# Patient Record
Sex: FEMALE | Race: WHITE | NOT HISPANIC OR LATINO | Employment: OTHER | URBAN - NONMETROPOLITAN AREA
[De-identification: names, ages, dates, MRNs, and addresses within clinical notes are randomized per-mention and may not be internally consistent; named-entity substitution may affect disease eponyms.]

---

## 2017-02-16 ENCOUNTER — OFFICE VISIT (OUTPATIENT)
Dept: UROLOGY | Facility: CLINIC | Age: 27
End: 2017-02-16

## 2017-02-16 DIAGNOSIS — R39.82 CHRONIC BLADDER PAIN: ICD-10-CM

## 2017-02-16 DIAGNOSIS — Z72.0 TOBACCO USE: ICD-10-CM

## 2017-02-16 DIAGNOSIS — Z87.448 HISTORY OF KIDNEY PROBLEMS: ICD-10-CM

## 2017-02-16 DIAGNOSIS — R31.29 MICROHEMATURIA: ICD-10-CM

## 2017-02-16 DIAGNOSIS — N39.0 URINARY TRACT INFECTION, SITE UNSPECIFIED: Primary | ICD-10-CM

## 2017-02-16 PROBLEM — R10.32 ABDOMINAL PAIN, LEFT LOWER QUADRANT: Status: ACTIVE | Noted: 2017-02-16

## 2017-02-16 PROBLEM — K58.9 ADAPTIVE COLITIS: Status: ACTIVE | Noted: 2017-02-16

## 2017-02-16 PROBLEM — K92.1 BLOOD IN FECES: Status: ACTIVE | Noted: 2017-02-16

## 2017-02-16 PROBLEM — R10.13 ABDOMINAL PAIN, EPIGASTRIC: Status: ACTIVE | Noted: 2017-02-16

## 2017-02-16 LAB
BILIRUB BLD-MCNC: NEGATIVE MG/DL
CLARITY, POC: CLEAR
COLOR UR: YELLOW
GLUCOSE UR STRIP-MCNC: NEGATIVE MG/DL
KETONES UR QL: ABNORMAL
LEUKOCYTE EST, POC: NEGATIVE
NITRITE UR-MCNC: NEGATIVE MG/ML
PH UR: 6.5 [PH] (ref 5–8)
PROT UR STRIP-MCNC: NEGATIVE MG/DL
RBC # UR STRIP: ABNORMAL /UL
SP GR UR: 1.02 (ref 1–1.03)
UROBILINOGEN UR QL: NORMAL

## 2017-02-16 PROCEDURE — 81003 URINALYSIS AUTO W/O SCOPE: CPT | Performed by: NURSE PRACTITIONER

## 2017-02-16 PROCEDURE — 99203 OFFICE O/P NEW LOW 30 MIN: CPT | Performed by: NURSE PRACTITIONER

## 2017-02-16 RX ORDER — BUSPIRONE HYDROCHLORIDE 10 MG/1
TABLET ORAL
Refills: 4 | COMMUNITY
Start: 2017-01-18 | End: 2021-10-25

## 2017-02-16 RX ORDER — LAMOTRIGINE 25 MG/1
TABLET ORAL
Refills: 4 | COMMUNITY
Start: 2017-01-18 | End: 2021-10-25

## 2017-02-16 RX ORDER — MIRTAZAPINE 15 MG/1
30 TABLET, FILM COATED ORAL NIGHTLY
Refills: 4 | COMMUNITY
Start: 2017-01-18

## 2017-02-16 NOTE — PROGRESS NOTES
Chief Complaint:          Chief Complaint   Patient presents with   • Urinary Tract Infection     Frequent       HPI:   26 y.o. female being seen in the office today for history of recurrent urinary tract infections. Patient states she gets a UTI which is treated with antibiotic's monthly. Is not sure if urine cultures have been completed. She does complain of pain in the bladder region, urinary frequency of every 10-15 minutes, back pain, nocturia, and dysuria. She states the symptoms are helped with the use of bladder polyps for a few days then symptoms return. She reports in 2016 she was admitted at [facility] for a diagnoses of pyelonephritis.  Denies any symptoms today. Urinalysis today reveals trace amount of blood and trace ketones.  She is a smoker is currently using an e-cigarette.    HPI        Past Medical History:        Past Medical History   Diagnosis Date   • Bipolar 1 disorder    • PCOS (polycystic ovarian syndrome)    • PTSD (post-traumatic stress disorder)    • Urinary tract infection          Current Meds:     Current Outpatient Prescriptions   Medication Sig Dispense Refill   • busPIRone (BUSPAR) 10 MG tablet   4   • lamoTRIgine (LaMICtal) 25 MG tablet   4   • mirtazapine (REMERON) 15 MG tablet   4   • SPRINTEC 28 0.25-35 MG-MCG per tablet   5   • Vortioxetine HBr (TRINTELLIX) 10 MG tablet Take  by mouth.       No current facility-administered medications for this visit.         Allergies:      Allergies   Allergen Reactions   • Latex    • Penicillins Nausea And Vomiting        Past Surgical History:     Past Surgical History   Procedure Laterality Date   •  section     • Dilatation and curettage     • Mastic tooth extraction           Social History:     Social History     Social History   • Marital status: Unknown     Spouse name: N/A   • Number of children: N/A   • Years of education: N/A     Occupational History   • Not on file.     Social History Main Topics   •  Smoking status: Current Every Day Smoker     Types: Electronic Cigarette   • Smokeless tobacco: Never Used   • Alcohol use No   • Drug use: No   • Sexual activity: Not on file     Other Topics Concern   • Not on file     Social History Narrative   • No narrative on file       Family History:     Family History   Problem Relation Age of Onset   • Family history unknown: Yes       Review of Systems:     Review of Systems   All other systems reviewed and are negative.      Physical Exam:     Physical Exam   Constitutional: She is oriented to person, place, and time. She appears well-developed and well-nourished. No distress.   Abdominal: Soft. Bowel sounds are normal. She exhibits no distension and no mass. There is no tenderness. There is no rebound and no guarding. No hernia.   Musculoskeletal: Normal range of motion.   Neurological: She is alert and oriented to person, place, and time.   Skin: Skin is warm and dry. No rash noted. She is not diaphoretic. No erythema. No pallor.   Psychiatric: She has a normal mood and affect. Her behavior is normal. Judgment and thought content normal.   Nursing note and vitals reviewed.      Procedure:     No notes on file      Assessment:     Encounter Diagnoses   Name Primary?   • Urinary tract infection, site unspecified Yes   • Microhematuria    • Chronic bladder pain    • History of kidney problems    • Tobacco use      Orders Placed This Encounter   Procedures   • CT Abdomen Pelvis With & Without Contrast     Standing Status:   Future     Standing Expiration Date:   2/16/2018     Scheduling Instructions:      No oral contrast, do renal mass protocol     Order Specific Question:   Reason for Exam:     Answer:   microhematuria; recurrent UTI, bladder pain     Order Specific Question:   Is the patient pregnant?     Answer:   No   • Basic Metabolic Panel   • POC Urinalysis Dipstick, Automated       Plan:   Obtain patient's last urine culture from the  which revealed no growth.  Discussed possible causes of painful bladder syndrome with the patient including interstitial cystitis.  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 Further recommend she decreased her caffeine intake and her nicotine use.  IC diet given.  Since she does complain of back pain and does have microhematuria along with a history of pyelonephritis recommend she have a CT scan of the abdomen and pelvis with and without contrast on a renal mass protocol and a cystoscopy by either Dr. Goddard or Dr. Bhatt to evaluate the bladder lining in to rule out interstitial cystitis.    Counseling was given to patient and family for the following topics diagnostic results, patient and family education, impressions and risks and benefits of treatment options. and the interim medical history and current results were reviewed.  A treatment plan with follow-up was made. Total time of the encounter was 35 minutes and 35 minutes were spent discussing Urinary tract infection, site unspecified [N39.0] face-to-face.       This document has been electronically signed by ENRIQUE Kim February 16, 2017 3:34 PM

## 2017-03-29 ENCOUNTER — HOSPITAL ENCOUNTER (OUTPATIENT)
Dept: CT IMAGING | Facility: HOSPITAL | Age: 27
Discharge: HOME | End: 2017-03-29
Admitting: NURSE PRACTITIONER

## 2017-03-29 DIAGNOSIS — Z87.448 HISTORY OF KIDNEY PROBLEMS: ICD-10-CM

## 2017-03-29 DIAGNOSIS — R39.82 CHRONIC BLADDER PAIN: ICD-10-CM

## 2017-03-29 DIAGNOSIS — Z72.0 TOBACCO USE: ICD-10-CM

## 2017-03-29 DIAGNOSIS — R31.29 MICROHEMATURIA: ICD-10-CM

## 2017-03-29 DIAGNOSIS — N39.0 URINARY TRACT INFECTION, SITE UNSPECIFIED: ICD-10-CM

## 2017-03-29 LAB
B-HCG UR QL: NEGATIVE
CREAT BLDA-MCNC: 0.9 MG/DL (ref 0.6–1.3)
INTERNAL NEGATIVE CONTROL: NEGATIVE
INTERNAL POSITIVE CONTROL: POSITIVE
Lab: NORMAL

## 2017-03-29 PROCEDURE — 82565 ASSAY OF CREATININE: CPT

## 2017-03-29 PROCEDURE — 74178 CT ABD&PLV WO CNTR FLWD CNTR: CPT | Performed by: RADIOLOGY

## 2017-03-29 PROCEDURE — 74178 CT ABD&PLV WO CNTR FLWD CNTR: CPT

## 2017-03-29 PROCEDURE — 0 IOPAMIDOL 61 % SOLUTION: Performed by: NURSE PRACTITIONER

## 2017-03-29 RX ADMIN — IOPAMIDOL 100 ML: 612 INJECTION, SOLUTION INTRAVENOUS at 14:30

## 2017-04-11 ENCOUNTER — PROCEDURE VISIT (OUTPATIENT)
Dept: UROLOGY | Facility: CLINIC | Age: 27
End: 2017-04-11

## 2017-04-11 DIAGNOSIS — N39.0 URINARY TRACT INFECTION, SITE UNSPECIFIED: Primary | ICD-10-CM

## 2017-04-11 LAB
BILIRUB BLD-MCNC: NEGATIVE MG/DL
CLARITY, POC: CLEAR
COLOR UR: YELLOW
GLUCOSE UR STRIP-MCNC: NEGATIVE MG/DL
KETONES UR QL: NEGATIVE
LEUKOCYTE EST, POC: NEGATIVE
NITRITE UR-MCNC: NEGATIVE MG/ML
PH UR: 6.5 [PH] (ref 5–8)
PROT UR STRIP-MCNC: NEGATIVE MG/DL
RBC # UR STRIP: NEGATIVE /UL
SP GR UR: 1.02 (ref 1–1.03)
UROBILINOGEN UR QL: NORMAL

## 2017-04-11 PROCEDURE — 81003 URINALYSIS AUTO W/O SCOPE: CPT | Performed by: UROLOGY

## 2017-04-11 PROCEDURE — 52000 CYSTOURETHROSCOPY: CPT | Performed by: UROLOGY

## 2017-04-11 NOTE — PROGRESS NOTES
Chief Complaint:       Chief Complaint   Patient presents with   • cystoscopy     recurrent uti/ evaluate bladder lining to rule out interstitial cystitis           HPI:       HPI  Pt here for cystoscopy        PMI:      Past Medical History:   Diagnosis Date   • Bipolar 1 disorder    • PCOS (polycystic ovarian syndrome)    • PTSD (post-traumatic stress disorder)    • Urinary tract infection            Medications:        Current Outpatient Prescriptions:   •  busPIRone (BUSPAR) 10 MG tablet, , Disp: , Rfl: 4  •  lamoTRIgine (LaMICtal) 25 MG tablet, , Disp: , Rfl: 4  •  mirtazapine (REMERON) 15 MG tablet, , Disp: , Rfl: 4  •  SPRINTEC 28 0.25-35 MG-MCG per tablet, , Disp: , Rfl: 5  •  Vortioxetine HBr (TRINTELLIX) 10 MG tablet, Take  by mouth., Disp: , Rfl:         Allergies:      Allergies   Allergen Reactions   • Latex    • Penicillins Nausea And Vomiting           Past Surgical Histroy:      Past Surgical History:   Procedure Laterality Date   •  SECTION     • DILATATION AND CURETTAGE     • WISDOM TOOTH EXTRACTION             Social History:      Social History     Social History   • Marital status: Unknown     Spouse name: N/A   • Number of children: N/A   • Years of education: N/A     Occupational History   • Not on file.     Social History Main Topics   • Smoking status: Current Every Day Smoker     Types: Electronic Cigarette   • Smokeless tobacco: Never Used   • Alcohol use No   • Drug use: No   • Sexual activity: Not on file     Other Topics Concern   • Not on file     Social History Narrative           Family History:      Family History   Problem Relation Age of Onset   • Family history unknown: Yes             Physical Exam:      Physical Exam        Procedure:      Procedure: Cystoscopy Female    Indication: recurrent uti's and bladder pain    Urinalysis Performed Today:  Negative for Infection    Premedication:none    Informed Consent Obtained    Sterile prep performed in usual fashion    6  cc of topical lidocaine inserted urethrally    Flexible cystoscope inserted and bladder examined    Findings: normal: Urethra without lesions, Bladder mucosa without tumors or lesions, No stones seen, ureteral orifices are in orthotopic position and size.    Additional Procedure with Cystoscopy: none    Discussion:      Try IC diet.  Consider myrbetriq.  Counseling was given to patient for the following topics instructions for management. and the interim medical history and current results were reviewed.  A treatment plan with follow-up was made. Total time of the encounter was 25 minutes and 25 minutes were spent discussing Urinary tract infection, site unspecified [N39.0] face-to-face.      This document has been electronically signed by Patricio Goddard MD April 11, 2017 3:36 PM

## 2021-09-30 ENCOUNTER — TRANSCRIBE ORDERS (OUTPATIENT)
Dept: ADMINISTRATIVE | Facility: HOSPITAL | Age: 31
End: 2021-09-30

## 2021-09-30 DIAGNOSIS — Z01.818 OTHER SPECIFIED PRE-OPERATIVE EXAMINATION: Primary | ICD-10-CM

## 2021-10-19 ENCOUNTER — PREP FOR SURGERY (OUTPATIENT)
Dept: OTHER | Facility: HOSPITAL | Age: 31
End: 2021-10-19

## 2021-10-19 DIAGNOSIS — Z30.2 ENCOUNTER FOR FEMALE STERILIZATION PROCEDURE: Primary | ICD-10-CM

## 2021-10-19 RX ORDER — SODIUM CHLORIDE 0.9 % (FLUSH) 0.9 %
10 SYRINGE (ML) INJECTION AS NEEDED
Status: CANCELLED | OUTPATIENT
Start: 2021-10-27

## 2021-10-19 RX ORDER — SODIUM CHLORIDE 0.9 % (FLUSH) 0.9 %
3 SYRINGE (ML) INJECTION EVERY 12 HOURS SCHEDULED
Status: CANCELLED | OUTPATIENT
Start: 2021-10-27

## 2021-10-24 ENCOUNTER — PREP FOR SURGERY (OUTPATIENT)
Dept: OTHER | Facility: HOSPITAL | Age: 31
End: 2021-10-24

## 2021-10-25 ENCOUNTER — LAB (OUTPATIENT)
Dept: LAB | Facility: HOSPITAL | Age: 31
End: 2021-10-25

## 2021-10-25 ENCOUNTER — PRE-ADMISSION TESTING (OUTPATIENT)
Dept: PREADMISSION TESTING | Facility: HOSPITAL | Age: 31
End: 2021-10-25

## 2021-10-25 DIAGNOSIS — Z01.818 OTHER SPECIFIED PRE-OPERATIVE EXAMINATION: ICD-10-CM

## 2021-10-25 DIAGNOSIS — Z30.2 ENCOUNTER FOR FEMALE STERILIZATION PROCEDURE: ICD-10-CM

## 2021-10-25 LAB
ABO GROUP BLD: NORMAL
ANION GAP SERPL CALCULATED.3IONS-SCNC: 10.2 MMOL/L (ref 5–15)
BASOPHILS # BLD AUTO: 0.03 10*3/MM3 (ref 0–0.2)
BASOPHILS NFR BLD AUTO: 0.3 % (ref 0–1.5)
BLD GP AB SCN SERPL QL: NEGATIVE
BUN SERPL-MCNC: 8 MG/DL (ref 6–20)
BUN/CREAT SERPL: 11.3 (ref 7–25)
CALCIUM SPEC-SCNC: 10.3 MG/DL (ref 8.6–10.5)
CHLORIDE SERPL-SCNC: 105 MMOL/L (ref 98–107)
CO2 SERPL-SCNC: 24.8 MMOL/L (ref 22–29)
CREAT SERPL-MCNC: 0.71 MG/DL (ref 0.57–1)
DEPRECATED RDW RBC AUTO: 44.6 FL (ref 37–54)
EOSINOPHIL # BLD AUTO: 0.07 10*3/MM3 (ref 0–0.4)
EOSINOPHIL NFR BLD AUTO: 0.7 % (ref 0.3–6.2)
ERYTHROCYTE [DISTWIDTH] IN BLOOD BY AUTOMATED COUNT: 14 % (ref 12.3–15.4)
GFR SERPL CREATININE-BSD FRML MDRD: 95 ML/MIN/1.73
GLUCOSE SERPL-MCNC: 113 MG/DL (ref 65–99)
HCG SERPL QL: NEGATIVE
HCT VFR BLD AUTO: 44.2 % (ref 34–46.6)
HGB BLD-MCNC: 13.5 G/DL (ref 12–15.9)
IMM GRANULOCYTES # BLD AUTO: 0.02 10*3/MM3 (ref 0–0.05)
IMM GRANULOCYTES NFR BLD AUTO: 0.2 % (ref 0–0.5)
LYMPHOCYTES # BLD AUTO: 2.97 10*3/MM3 (ref 0.7–3.1)
LYMPHOCYTES NFR BLD AUTO: 29.5 % (ref 19.6–45.3)
MCH RBC QN AUTO: 26.3 PG (ref 26.6–33)
MCHC RBC AUTO-ENTMCNC: 30.5 G/DL (ref 31.5–35.7)
MCV RBC AUTO: 86.2 FL (ref 79–97)
MONOCYTES # BLD AUTO: 0.45 10*3/MM3 (ref 0.1–0.9)
MONOCYTES NFR BLD AUTO: 4.5 % (ref 5–12)
NEUTROPHILS NFR BLD AUTO: 6.54 10*3/MM3 (ref 1.7–7)
NEUTROPHILS NFR BLD AUTO: 64.8 % (ref 42.7–76)
NRBC BLD AUTO-RTO: 0 /100 WBC (ref 0–0.2)
PLATELET # BLD AUTO: 503 10*3/MM3 (ref 140–450)
PMV BLD AUTO: 9 FL (ref 6–12)
POTASSIUM SERPL-SCNC: 4.1 MMOL/L (ref 3.5–5.2)
RBC # BLD AUTO: 5.13 10*6/MM3 (ref 3.77–5.28)
RH BLD: NEGATIVE
SODIUM SERPL-SCNC: 140 MMOL/L (ref 136–145)
T&S EXPIRATION DATE: NORMAL
WBC # BLD AUTO: 10.08 10*3/MM3 (ref 3.4–10.8)

## 2021-10-25 PROCEDURE — 85025 COMPLETE CBC W/AUTO DIFF WBC: CPT

## 2021-10-25 PROCEDURE — C9803 HOPD COVID-19 SPEC COLLECT: HCPCS

## 2021-10-25 PROCEDURE — 36415 COLL VENOUS BLD VENIPUNCTURE: CPT

## 2021-10-25 PROCEDURE — U0004 COV-19 TEST NON-CDC HGH THRU: HCPCS | Performed by: OBSTETRICS & GYNECOLOGY

## 2021-10-25 PROCEDURE — 93010 ELECTROCARDIOGRAM REPORT: CPT | Performed by: INTERNAL MEDICINE

## 2021-10-25 PROCEDURE — 80048 BASIC METABOLIC PNL TOTAL CA: CPT

## 2021-10-25 PROCEDURE — 93005 ELECTROCARDIOGRAM TRACING: CPT

## 2021-10-25 PROCEDURE — 86900 BLOOD TYPING SEROLOGIC ABO: CPT

## 2021-10-25 PROCEDURE — 86850 RBC ANTIBODY SCREEN: CPT

## 2021-10-25 PROCEDURE — 86901 BLOOD TYPING SEROLOGIC RH(D): CPT

## 2021-10-25 PROCEDURE — 84703 CHORIONIC GONADOTROPIN ASSAY: CPT

## 2021-10-25 RX ORDER — PROPRANOLOL HYDROCHLORIDE 20 MG/1
20 TABLET ORAL 2 TIMES DAILY
COMMUNITY

## 2021-10-25 RX ORDER — TOPIRAMATE 50 MG/1
50 TABLET, FILM COATED ORAL 2 TIMES DAILY
COMMUNITY

## 2021-10-25 RX ORDER — MULTIPLE VITAMINS W/ MINERALS TAB 9MG-400MCG
1 TAB ORAL DAILY
COMMUNITY

## 2021-10-25 RX ORDER — CHLORAL HYDRATE 500 MG
1200 CAPSULE ORAL
COMMUNITY

## 2021-10-25 NOTE — DISCHARGE INSTRUCTIONS
1030----10/27/21   ARRIVAL TIME    TAKE the following medications the morning of surgery:  All heart or blood pressure medications    HOLD all diabetic medications the morning of surgery as ordered by physician.    Please discontinue all blood thinners and anticoagulants (except aspirin) prior to surgery as per your surgeon and cardiologist instructions.  Aspirin may be continued up to the day prior to surgery.     CHLORHEXIDINE CLOTHS GIVEN WITH INSTRUCTIONS AND FORM TO RETURN TO HOSPITAL, IF APPLICABLE.    General Instructions:  · Do not eat or drink after midnight: includes water, mints, or gum. You may brush your teeth.  Dental appliances that are removable must be taken out day of surgery.  · Do not smoke, chew tobacco, or drink alcohol.  · Bring medications in original bottles, any inhalers and if applicable your C-PAP/BI-PAP machine.  · Bring any papers given to you in the doctor's office.  · Wear clean comfortable clothes and socks.  · Do not wear contact lenses or make-up. Bring a case for your glasses if applicable.  · Bring crutches or walker if applicable.  · Leave all other valuables and jewelry at home.    If you were given a blood bank ID arm band remember to bring it with you the day of surgery.    Preventing a Surgical Site Infection:  Shower the night before surgery (unless instructed other wise) using a fresh bar of anti-bacterial soap (such as Dial) and clean washcloth. Dry with a clean towel and dress in clean clothing.  For 2 to 3 days before surgery, avoid shaving with a razor near where you will have surgery because the razor can irritate skin and make it easier to develop an infection. Ask your surgeon if you will be receiving antibiotics prior to surgery.  Make sure you, your family, and all healthcare providers clear their hands with soap and water or an alcohol-based hand  before caring for you or your wound.  If at all possible, quit smoking as many days before surgery as you  can.    Day of surgery:  Upon arrival, a Pre-op nurse and Anesthesiologist will review your health history, obtain vital signs, and answer questions you may have. The only belongings needed at this time will be your home medications and if applicable your C-PAP/BI-PAP machine. If you are staying overnight your family can leave the rest of your belongings in the car and bring them to your room later. A Pre-op nurse will start an IV and you may receive medication in preparation for surgery, including something to help you relax. Your family will be able to see you in the Pre-op area. While you are in surgery your family should notify the waiting room  if they leave the waiting room area and provide a contact phone number.    Please be aware that surgery does come with discomfort. We want to make every effort to control your discomfort so please discuss any uncontrolled symptoms with your nurse. Your doctor will most likely have prescribed pain medications.    If you are going home after surgery you will receive individualized written care instructions before being discharged. A responsible adult must drive you to and from the hospital on the day of surgery and stay with you for 24 hours.    If you are staying overnight following surgery, you will be transported to your hospital room following the recovery period.  [facility] has all private rooms.    If you have any questions please call Pre-Admission Testing at [phone].  Deductibles and co-payments are collected on the day of service. Please be prepared to pay the required co-pay, deductible or deposit on the day of service as defined by your plan.    A RESPONSIBLE PERSON MUST REMAIN IN THE WAITING ROOM DURING YOUR PROCEDURE AND A RESPONSIBLE  MUST BE AVAILABLE UPON YOUR DISCHARGE.

## 2021-10-26 LAB
QT INTERVAL: 376 MS
QTC INTERVAL: 406 MS
SARS-COV-2 RNA PNL SPEC NAA+PROBE: NOT DETECTED

## 2021-10-27 ENCOUNTER — HOSPITAL ENCOUNTER (OUTPATIENT)
Facility: HOSPITAL | Age: 31
Setting detail: HOSPITAL OUTPATIENT SURGERY
Discharge: HOME | End: 2021-10-27
Attending: OBSTETRICS & GYNECOLOGY | Admitting: OBSTETRICS & GYNECOLOGY

## 2021-10-27 ENCOUNTER — ANESTHESIA (OUTPATIENT)
Dept: PERIOP | Facility: HOSPITAL | Age: 31
End: 2021-10-27

## 2021-10-27 ENCOUNTER — ANESTHESIA EVENT (OUTPATIENT)
Dept: PERIOP | Facility: HOSPITAL | Age: 31
End: 2021-10-27

## 2021-10-27 ENCOUNTER — APPOINTMENT (OUTPATIENT)
Dept: GENERAL RADIOLOGY | Facility: HOSPITAL | Age: 31
End: 2021-10-27

## 2021-10-27 VITALS
HEART RATE: 95 BPM | TEMPERATURE: 98.3 F | SYSTOLIC BLOOD PRESSURE: 125 MMHG | HEIGHT: 66 IN | WEIGHT: 222 LBS | DIASTOLIC BLOOD PRESSURE: 77 MMHG | BODY MASS INDEX: 35.68 KG/M2 | OXYGEN SATURATION: 99 % | RESPIRATION RATE: 18 BRPM

## 2021-10-27 DIAGNOSIS — Z30.2 ENCOUNTER FOR FEMALE STERILIZATION PROCEDURE: ICD-10-CM

## 2021-10-27 LAB
ABO GROUP BLD: NORMAL
B-HCG UR QL: NEGATIVE
EXPIRATION DATE: NORMAL
INTERNAL NEGATIVE CONTROL: NEGATIVE
INTERNAL POSITIVE CONTROL: POSITIVE
Lab: NORMAL
RH BLD: NEGATIVE

## 2021-10-27 PROCEDURE — 25010000002 FENTANYL CITRATE (PF) 50 MCG/ML SOLUTION: Performed by: NURSE ANESTHETIST, CERTIFIED REGISTERED

## 2021-10-27 PROCEDURE — 25010000002 MIDAZOLAM PER 1 MG: Performed by: NURSE ANESTHETIST, CERTIFIED REGISTERED

## 2021-10-27 PROCEDURE — C1889 IMPLANT/INSERT DEVICE, NOC: HCPCS | Performed by: OBSTETRICS & GYNECOLOGY

## 2021-10-27 PROCEDURE — 25010000002 NEOSTIGMINE 10 MG/10ML SOLUTION: Performed by: NURSE ANESTHETIST, CERTIFIED REGISTERED

## 2021-10-27 PROCEDURE — 25010000002 ONDANSETRON PER 1 MG: Performed by: NURSE ANESTHETIST, CERTIFIED REGISTERED

## 2021-10-27 PROCEDURE — 25010000002 HYDROMORPHONE 1 MG/ML SOLUTION: Performed by: NURSE ANESTHETIST, CERTIFIED REGISTERED

## 2021-10-27 PROCEDURE — 25010000002 DEXAMETHASONE PER 1 MG: Performed by: NURSE ANESTHETIST, CERTIFIED REGISTERED

## 2021-10-27 PROCEDURE — 25010000002 CEFOXITIN PER 1 G: Performed by: NURSE PRACTITIONER

## 2021-10-27 PROCEDURE — 86901 BLOOD TYPING SEROLOGIC RH(D): CPT

## 2021-10-27 PROCEDURE — 86900 BLOOD TYPING SEROLOGIC ABO: CPT

## 2021-10-27 PROCEDURE — 81025 URINE PREGNANCY TEST: CPT | Performed by: ANESTHESIOLOGY

## 2021-10-27 PROCEDURE — 0 MEPERIDINE PER 100 MG: Performed by: NURSE ANESTHETIST, CERTIFIED REGISTERED

## 2021-10-27 PROCEDURE — 25010000002 KETOROLAC TROMETHAMINE PER 15 MG: Performed by: NURSE ANESTHETIST, CERTIFIED REGISTERED

## 2021-10-27 PROCEDURE — 25010000002 PROPOFOL 10 MG/ML EMULSION: Performed by: NURSE ANESTHETIST, CERTIFIED REGISTERED

## 2021-10-27 DEVICE — THE FILSHIE TUBAL LIGATION SYSTEM - FILSHIE CLIPS FOR PERMANENT FEMALE STERILIZATION BY OCCLUSION OF THE FALLOPIAN TUBES.
Type: IMPLANTABLE DEVICE | Site: FALLOPIAN TUBE | Status: FUNCTIONAL
Brand: FILSHIE® CLIPS (FEMCARE)

## 2021-10-27 RX ORDER — SODIUM CHLORIDE 0.9 % (FLUSH) 0.9 %
10 SYRINGE (ML) INJECTION AS NEEDED
Status: DISCONTINUED | OUTPATIENT
Start: 2021-10-27 | End: 2021-10-27 | Stop reason: HOSPADM

## 2021-10-27 RX ORDER — ONDANSETRON 2 MG/ML
4 INJECTION INTRAMUSCULAR; INTRAVENOUS AS NEEDED
Status: DISCONTINUED | OUTPATIENT
Start: 2021-10-27 | End: 2021-10-27 | Stop reason: HOSPADM

## 2021-10-27 RX ORDER — GLYCOPYRROLATE 0.2 MG/ML
INJECTION INTRAMUSCULAR; INTRAVENOUS AS NEEDED
Status: DISCONTINUED | OUTPATIENT
Start: 2021-10-27 | End: 2021-10-27 | Stop reason: SURG

## 2021-10-27 RX ORDER — SODIUM CHLORIDE, SODIUM LACTATE, POTASSIUM CHLORIDE, CALCIUM CHLORIDE 600; 310; 30; 20 MG/100ML; MG/100ML; MG/100ML; MG/100ML
INJECTION, SOLUTION INTRAVENOUS CONTINUOUS PRN
Status: DISCONTINUED | OUTPATIENT
Start: 2021-10-27 | End: 2021-10-27 | Stop reason: SURG

## 2021-10-27 RX ORDER — BUPIVACAINE HYDROCHLORIDE 2.5 MG/ML
INJECTION, SOLUTION INFILTRATION; PERINEURAL AS NEEDED
Status: DISCONTINUED | OUTPATIENT
Start: 2021-10-27 | End: 2021-10-27 | Stop reason: HOSPADM

## 2021-10-27 RX ORDER — DROPERIDOL 2.5 MG/ML
0.62 INJECTION, SOLUTION INTRAMUSCULAR; INTRAVENOUS ONCE AS NEEDED
Status: DISCONTINUED | OUTPATIENT
Start: 2021-10-27 | End: 2021-10-27 | Stop reason: HOSPADM

## 2021-10-27 RX ORDER — FENTANYL CITRATE 50 UG/ML
50 INJECTION, SOLUTION INTRAMUSCULAR; INTRAVENOUS
Status: DISCONTINUED | OUTPATIENT
Start: 2021-10-27 | End: 2021-10-27 | Stop reason: HOSPADM

## 2021-10-27 RX ORDER — MEPERIDINE HYDROCHLORIDE 25 MG/ML
12.5 INJECTION INTRAMUSCULAR; INTRAVENOUS; SUBCUTANEOUS
Status: COMPLETED | OUTPATIENT
Start: 2021-10-27 | End: 2021-10-27

## 2021-10-27 RX ORDER — MIDAZOLAM HYDROCHLORIDE 1 MG/ML
INJECTION INTRAMUSCULAR; INTRAVENOUS AS NEEDED
Status: DISCONTINUED | OUTPATIENT
Start: 2021-10-27 | End: 2021-10-27 | Stop reason: SURG

## 2021-10-27 RX ORDER — LIDOCAINE HYDROCHLORIDE 20 MG/ML
INJECTION, SOLUTION INFILTRATION; PERINEURAL AS NEEDED
Status: DISCONTINUED | OUTPATIENT
Start: 2021-10-27 | End: 2021-10-27 | Stop reason: SURG

## 2021-10-27 RX ORDER — SODIUM CHLORIDE 0.9 % (FLUSH) 0.9 %
10 SYRINGE (ML) INJECTION EVERY 12 HOURS SCHEDULED
Status: DISCONTINUED | OUTPATIENT
Start: 2021-10-27 | End: 2021-10-27 | Stop reason: HOSPADM

## 2021-10-27 RX ORDER — PROPOFOL 10 MG/ML
VIAL (ML) INTRAVENOUS AS NEEDED
Status: DISCONTINUED | OUTPATIENT
Start: 2021-10-27 | End: 2021-10-27 | Stop reason: SURG

## 2021-10-27 RX ORDER — OXYCODONE HYDROCHLORIDE AND ACETAMINOPHEN 5; 325 MG/1; MG/1
1 TABLET ORAL ONCE AS NEEDED
Status: COMPLETED | OUTPATIENT
Start: 2021-10-27 | End: 2021-10-27

## 2021-10-27 RX ORDER — SODIUM CHLORIDE 0.9 % (FLUSH) 0.9 %
3 SYRINGE (ML) INJECTION EVERY 12 HOURS SCHEDULED
Status: DISCONTINUED | OUTPATIENT
Start: 2021-10-27 | End: 2021-10-27 | Stop reason: HOSPADM

## 2021-10-27 RX ORDER — IBUPROFEN 600 MG/1
600 TABLET ORAL EVERY 6 HOURS PRN
Qty: 30 TABLET | Refills: 0 | Status: SHIPPED | OUTPATIENT
Start: 2021-10-27 | End: 2021-11-26

## 2021-10-27 RX ORDER — SODIUM CHLORIDE, SODIUM LACTATE, POTASSIUM CHLORIDE, CALCIUM CHLORIDE 600; 310; 30; 20 MG/100ML; MG/100ML; MG/100ML; MG/100ML
100 INJECTION, SOLUTION INTRAVENOUS ONCE AS NEEDED
Status: DISCONTINUED | OUTPATIENT
Start: 2021-10-27 | End: 2021-10-27 | Stop reason: HOSPADM

## 2021-10-27 RX ORDER — VECURONIUM BROMIDE 1 MG/ML
INJECTION, POWDER, LYOPHILIZED, FOR SOLUTION INTRAVENOUS AS NEEDED
Status: DISCONTINUED | OUTPATIENT
Start: 2021-10-27 | End: 2021-10-27 | Stop reason: SURG

## 2021-10-27 RX ORDER — FAMOTIDINE 10 MG/ML
INJECTION, SOLUTION INTRAVENOUS AS NEEDED
Status: DISCONTINUED | OUTPATIENT
Start: 2021-10-27 | End: 2021-10-27 | Stop reason: SURG

## 2021-10-27 RX ORDER — MAGNESIUM HYDROXIDE 1200 MG/15ML
LIQUID ORAL AS NEEDED
Status: DISCONTINUED | OUTPATIENT
Start: 2021-10-27 | End: 2021-10-27 | Stop reason: HOSPADM

## 2021-10-27 RX ORDER — DEXAMETHASONE SODIUM PHOSPHATE 10 MG/ML
INJECTION INTRAMUSCULAR; INTRAVENOUS AS NEEDED
Status: DISCONTINUED | OUTPATIENT
Start: 2021-10-27 | End: 2021-10-27 | Stop reason: SURG

## 2021-10-27 RX ORDER — NEOSTIGMINE METHYLSULFATE 1 MG/ML
INJECTION, SOLUTION INTRAVENOUS AS NEEDED
Status: DISCONTINUED | OUTPATIENT
Start: 2021-10-27 | End: 2021-10-27 | Stop reason: SURG

## 2021-10-27 RX ORDER — IPRATROPIUM BROMIDE AND ALBUTEROL SULFATE 2.5; .5 MG/3ML; MG/3ML
3 SOLUTION RESPIRATORY (INHALATION) ONCE AS NEEDED
Status: DISCONTINUED | OUTPATIENT
Start: 2021-10-27 | End: 2021-10-27 | Stop reason: HOSPADM

## 2021-10-27 RX ORDER — FENTANYL CITRATE 50 UG/ML
INJECTION, SOLUTION INTRAMUSCULAR; INTRAVENOUS AS NEEDED
Status: DISCONTINUED | OUTPATIENT
Start: 2021-10-27 | End: 2021-10-27 | Stop reason: SURG

## 2021-10-27 RX ORDER — SODIUM CHLORIDE, SODIUM LACTATE, POTASSIUM CHLORIDE, CALCIUM CHLORIDE 600; 310; 30; 20 MG/100ML; MG/100ML; MG/100ML; MG/100ML
125 INJECTION, SOLUTION INTRAVENOUS ONCE
Status: COMPLETED | OUTPATIENT
Start: 2021-10-27 | End: 2021-10-27

## 2021-10-27 RX ORDER — MIDAZOLAM HYDROCHLORIDE 1 MG/ML
1 INJECTION INTRAMUSCULAR; INTRAVENOUS
Status: DISCONTINUED | OUTPATIENT
Start: 2021-10-27 | End: 2021-10-27 | Stop reason: HOSPADM

## 2021-10-27 RX ORDER — KETOROLAC TROMETHAMINE 30 MG/ML
INJECTION, SOLUTION INTRAMUSCULAR; INTRAVENOUS AS NEEDED
Status: DISCONTINUED | OUTPATIENT
Start: 2021-10-27 | End: 2021-10-27 | Stop reason: SURG

## 2021-10-27 RX ORDER — HYDROCODONE BITARTRATE AND ACETAMINOPHEN 5; 325 MG/1; MG/1
1 TABLET ORAL EVERY 4 HOURS PRN
Qty: 6 TABLET | Refills: 0 | Status: SHIPPED | OUTPATIENT
Start: 2021-10-27

## 2021-10-27 RX ORDER — ONDANSETRON 2 MG/ML
INJECTION INTRAMUSCULAR; INTRAVENOUS AS NEEDED
Status: DISCONTINUED | OUTPATIENT
Start: 2021-10-27 | End: 2021-10-27 | Stop reason: SURG

## 2021-10-27 RX ORDER — BUPIVACAINE HYDROCHLORIDE AND EPINEPHRINE 2.5; 5 MG/ML; UG/ML
INJECTION, SOLUTION EPIDURAL; INFILTRATION; INTRACAUDAL; PERINEURAL AS NEEDED
Status: DISCONTINUED | OUTPATIENT
Start: 2021-10-27 | End: 2021-10-27 | Stop reason: HOSPADM

## 2021-10-27 RX ADMIN — FAMOTIDINE 20 MG: 10 INJECTION INTRAVENOUS at 13:27

## 2021-10-27 RX ADMIN — NEOSTIGMINE 2.5 MG: 1 INJECTION INTRAVENOUS at 13:55

## 2021-10-27 RX ADMIN — FENTANYL CITRATE 50 MCG: 50 INJECTION INTRAMUSCULAR; INTRAVENOUS at 14:24

## 2021-10-27 RX ADMIN — FENTANYL CITRATE 100 MCG: 50 INJECTION INTRAMUSCULAR; INTRAVENOUS at 13:27

## 2021-10-27 RX ADMIN — MEPERIDINE HYDROCHLORIDE 12.5 MG: 25 INJECTION INTRAMUSCULAR; INTRAVENOUS; SUBCUTANEOUS at 14:57

## 2021-10-27 RX ADMIN — GLYCOPYRROLATE 0.4 MG: 0.2 INJECTION, SOLUTION INTRAMUSCULAR; INTRAVENOUS at 13:55

## 2021-10-27 RX ADMIN — SODIUM CHLORIDE, POTASSIUM CHLORIDE, SODIUM LACTATE AND CALCIUM CHLORIDE: 600; 310; 30; 20 INJECTION, SOLUTION INTRAVENOUS at 13:27

## 2021-10-27 RX ADMIN — DEXAMETHASONE SODIUM PHOSPHATE 8 MG: 10 INJECTION INTRAMUSCULAR; INTRAVENOUS at 13:37

## 2021-10-27 RX ADMIN — MEPERIDINE HYDROCHLORIDE 12.5 MG: 25 INJECTION INTRAMUSCULAR; INTRAVENOUS; SUBCUTANEOUS at 15:05

## 2021-10-27 RX ADMIN — CEFOXITIN 2 G: 2 INJECTION, POWDER, FOR SOLUTION INTRAVENOUS at 13:37

## 2021-10-27 RX ADMIN — OXYCODONE HYDROCHLORIDE AND ACETAMINOPHEN 1 TABLET: 5; 325 TABLET ORAL at 14:54

## 2021-10-27 RX ADMIN — FENTANYL CITRATE 50 MCG: 50 INJECTION INTRAMUSCULAR; INTRAVENOUS at 14:19

## 2021-10-27 RX ADMIN — VECURONIUM BROMIDE 4 MG: 1 INJECTION, POWDER, LYOPHILIZED, FOR SOLUTION INTRAVENOUS at 13:34

## 2021-10-27 RX ADMIN — KETOROLAC TROMETHAMINE 30 MG: 30 INJECTION, SOLUTION INTRAMUSCULAR at 13:37

## 2021-10-27 RX ADMIN — HYDROMORPHONE HYDROCHLORIDE 1 MG: 1 INJECTION, SOLUTION INTRAMUSCULAR; INTRAVENOUS; SUBCUTANEOUS at 14:33

## 2021-10-27 RX ADMIN — PROPOFOL 200 MG: 10 INJECTION, EMULSION INTRAVENOUS at 13:34

## 2021-10-27 RX ADMIN — ONDANSETRON 4 MG: 2 INJECTION INTRAMUSCULAR; INTRAVENOUS at 14:18

## 2021-10-27 RX ADMIN — MIDAZOLAM 2 MG: 1 INJECTION INTRAMUSCULAR; INTRAVENOUS at 13:27

## 2021-10-27 RX ADMIN — ONDANSETRON 4 MG: 2 INJECTION INTRAMUSCULAR; INTRAVENOUS at 13:27

## 2021-10-27 RX ADMIN — LIDOCAINE HYDROCHLORIDE 100 MG: 20 INJECTION, SOLUTION INFILTRATION; PERINEURAL at 13:34

## 2021-10-27 RX ADMIN — SODIUM CHLORIDE, POTASSIUM CHLORIDE, SODIUM LACTATE AND CALCIUM CHLORIDE 125 ML/HR: 600; 310; 30; 20 INJECTION, SOLUTION INTRAVENOUS at 12:54

## 2021-10-27 NOTE — ANESTHESIA PREPROCEDURE EVALUATION
Anesthesia Evaluation     Patient summary reviewed and Nursing notes reviewed   no history of anesthetic complications:  NPO Solid Status: > 8 hours  NPO Liquid Status: > 8 hours           Airway   Mallampati: II  TM distance: >3 FB  Neck ROM: full  No difficulty expected  Dental - normal exam     Pulmonary - negative pulmonary ROS and normal exam   Cardiovascular - negative cardio ROS and normal exam  Exercise tolerance: good (4-7 METS)    NYHA Classification: II        Neuro/Psych- negative ROS  GI/Hepatic/Renal/Endo    (+) obesity,       Musculoskeletal (-) negative ROS    Abdominal  - normal exam    Bowel sounds: normal.   Substance History - negative use     OB/GYN negative ob/gyn ROS         Other - negative ROS                     Anesthesia Plan    ASA 3     general     intravenous induction     Anesthetic plan, all risks, benefits, and alternatives have been provided, discussed and informed consent has been obtained with: patient.

## 2021-10-27 NOTE — ANESTHESIA POSTPROCEDURE EVALUATION
Patient: Dawna Stevens    Procedure Summary     Date: 10/27/21 Room / Location: [facility] OR 01 /  [facility] OR    Anesthesia Start: 1327 Anesthesia Stop: 1403    Procedure: BILATERAL TUBAL FALLOPE FILSHIE CLIPPING LAPAROSCOPIC (Bilateral Vagina) Diagnosis: (Z30.2)    Surgeons: Don Arce DO Provider: Patricio Chowdary MD    Anesthesia Type: general ASA Status: 3          Anesthesia Type: general    Vitals  No vitals data found for the desired time range.          Post Anesthesia Care and Evaluation    Patient location during evaluation: PHASE II  Patient participation: complete - patient participated  Level of consciousness: awake and alert  Pain score: 0  Pain management: adequate  Airway patency: patent  Anesthetic complications: No anesthetic complications    Cardiovascular status: acceptable  Respiratory status: acceptable  Hydration status: acceptable

## 2023-11-27 ENCOUNTER — LAB (OUTPATIENT)
Dept: UROLOGY | Facility: CLINIC | Age: 33
End: 2023-11-27
Payer: MEDICAID

## 2023-11-27 ENCOUNTER — OFFICE VISIT (OUTPATIENT)
Age: 33
End: 2023-11-27
Payer: MEDICAID

## 2023-11-27 VITALS — BODY MASS INDEX: 39.37 KG/M2 | HEIGHT: 66 IN | WEIGHT: 245 LBS

## 2023-11-27 DIAGNOSIS — R35.0 FREQUENCY OF MICTURITION: ICD-10-CM

## 2023-11-27 DIAGNOSIS — R31.0 GROSS HEMATURIA: ICD-10-CM

## 2023-11-27 DIAGNOSIS — N30.10 INTERSTITIAL CYSTITIS: Primary | ICD-10-CM

## 2023-11-27 DIAGNOSIS — R35.0 FREQUENCY OF MICTURITION: Primary | ICD-10-CM

## 2023-11-27 PROCEDURE — 87186 SC STD MICRODIL/AGAR DIL: CPT

## 2023-11-27 PROCEDURE — 87086 URINE CULTURE/COLONY COUNT: CPT

## 2023-11-27 PROCEDURE — 87147 CULTURE TYPE IMMUNOLOGIC: CPT

## 2023-11-27 RX ORDER — AMITRIPTYLINE HYDROCHLORIDE 10 MG/1
10 TABLET, FILM COATED ORAL DAILY
Qty: 30 TABLET | Refills: 1 | Status: SHIPPED | OUTPATIENT
Start: 2023-11-27

## 2023-11-27 RX ORDER — DICYCLOMINE HCL 20 MG
20 TABLET ORAL EVERY 6 HOURS
COMMUNITY

## 2023-11-27 NOTE — PROGRESS NOTES
"Chief Complaint:    Chief Complaint   Patient presents with    Gross Hematuria     New patient       Vital Signs:   Ht 167.6 cm (65.98\")   Wt 111 kg (245 lb)   BMI 39.56 kg/m²   Body mass index is 39.56 kg/m².      HPI:  Dawna Donaldson is a 33 y.o. female who presents today for initial evaluation     History of Present Illness  Mrs. donaldson presents to the clinic for initial evaluation of gross hematuria and bladder pain.  She has a past medical history significant for PCOS, PTSD, bipolar 1 disorder, IBS, and urinary tract infection.  She has been referred to us by Dr. Radha Cali DO.  She was seen by Dr. Goddard in 2017 for similar symptoms.  She underwent a cystoscopy at that time and according to his note there were no abnormalities seen.  He did recommend Botox at that time however patient did not wish to undergo procedure.  She reports that she did have notable left-sided back and flank pain with radiation into the pelvic area with notable gross hematuria in August and September.  She states symptoms lasted for a few days and then resolved.  She denies any history of kidney stones.  She did have a urine culture completed in 2016 that was positive for E. coli.  She has since had urine cultures that showed no growth.  She did have a UA completed by her referring provider on 11/15/2023 that showed no abnormalities.  Her urine was sent off for culture however I do not have these results.  She complains of significant cloudy urine, she is currently on her menstrual cycle.  Frequency, urgency, bladder pain, dyspareunia, vaginal pain, and dysuria.  She does also endorse eating spicy foods daily.  She only drinks water and denies any excessive intake of soda, sweet or unsweet tea, or dairy products.  She does have a 5-year pack history and vaped for 5 years.  States she is scheduled undergo a Pap smear at the beginning of December.      Past Medical History:  Past Medical History:   Diagnosis Date    Bipolar 1 disorder  "    History of transfusion     PCOS (polycystic ovarian syndrome)     PTSD (post-traumatic stress disorder)     Urinary tract infection        Current Meds:  Current Outpatient Medications   Medication Sig Dispense Refill    metFORMIN (GLUCOPHAGE) 1000 MG tablet Take 1 tablet by mouth 2 (Two) Times a Day With Meals.      mirtazapine (REMERON) 15 MG tablet Take 2 tablets by mouth Every Night.  4    multivitamin with minerals (WOMENS ONE DAILY PO) Take 1 tablet by mouth Daily.      Omega-3 Fatty Acids (fish oil) 1000 MG capsule capsule Take 1,200 mg by mouth Daily With Breakfast.      propranolol (INDERAL) 20 MG tablet Take 1 tablet by mouth 2 (Two) Times a Day.      topiramate (TOPAMAX) 50 MG tablet Take 1 tablet by mouth 2 (Two) Times a Day.      amitriptyline (ELAVIL) 10 MG tablet Take 1 tablet by mouth Daily. 30 tablet 1    dicyclomine (BENTYL) 20 MG tablet Take 1 tablet by mouth Every 6 (Six) Hours.      HYDROcodone-acetaminophen (NORCO) 5-325 MG per tablet Take 1 tablet by mouth Every 4 (Four) Hours As Needed for Moderate Pain . (Patient not taking: Reported on 2023) 6 tablet 0    SPRINTEC 28 0.25-35 MG-MCG per tablet  (Patient not taking: Reported on 2023)  5     No current facility-administered medications for this visit.        Allergies:   Allergies   Allergen Reactions    Latex     Penicillins Nausea And Vomiting        Past Surgical History:  Past Surgical History:   Procedure Laterality Date     SECTION      x3    DILATATION AND CURETTAGE      TUBAL COAGULATION LAPAROSCOPIC Bilateral 10/27/2021    Procedure: BILATERAL TUBAL FALLOPE FILSHIE CLIPPING LAPAROSCOPIC;  Surgeon: Don Arce DO;  Location: [facility];  Service: Obstetrics/Gynecology;  Laterality: Bilateral;    WISDOM TOOTH EXTRACTION         Social History:  Social History     Socioeconomic History    Marital status: Unknown   Tobacco Use    Smoking status: Former     Packs/day: 1.00     Years: 5.00     Additional  pack years: 0.00     Total pack years: 5.00     Types: Cigarettes     Quit date: 10/1/2015     Years since quittin.1    Smokeless tobacco: Never   Vaping Use    Vaping Use: Never used   Substance and Sexual Activity    Alcohol use: No    Drug use: No       Family History:  Family History   Adopted: Yes   Family history unknown: Yes       Review of Systems:  Review of Systems   Constitutional:  Negative for fatigue, fever and unexpected weight change.   Respiratory:  Negative for chest tightness and shortness of breath.    Cardiovascular:  Negative for chest pain.   Gastrointestinal:  Positive for constipation and diarrhea. Negative for abdominal pain, nausea and vomiting.   Genitourinary:  Positive for dyspareunia, dysuria, frequency, hematuria, urgency and vaginal pain. Negative for vaginal bleeding and vaginal discharge.   Skin: Negative.  Negative for rash.   Psychiatric/Behavioral:  Negative for confusion and suicidal ideas.        Physical Exam:  Physical Exam  Constitutional:       General: She is not in acute distress.     Appearance: Normal appearance.   HENT:      Head: Normocephalic and atraumatic.      Nose: Nose normal.      Mouth/Throat:      Mouth: Mucous membranes are moist.   Eyes:      Conjunctiva/sclera: Conjunctivae normal.   Cardiovascular:      Rate and Rhythm: Normal rate and regular rhythm.      Pulses: Normal pulses.      Heart sounds: Normal heart sounds.   Pulmonary:      Effort: Pulmonary effort is normal.      Breath sounds: Normal breath sounds.   Abdominal:      General: Bowel sounds are normal.      Palpations: Abdomen is soft.   Musculoskeletal:         General: Normal range of motion.      Cervical back: Normal range of motion.   Skin:     General: Skin is warm.   Neurological:      General: No focal deficit present.      Mental Status: She is alert and oriented to person, place, and time.   Psychiatric:         Mood and Affect: Mood normal.         Behavior: Behavior normal.          Thought Content: Thought content normal.         Judgment: Judgment normal.           Recent Image (CT and/or KUB):   CT Abdomen and Pelvis: Results for orders placed during the hospital encounter of 03/29/17    CT Abdomen Pelvis With & Without Contrast    Narrative  CT ABDOMEN AND PELVIS W WO CONTRAST-    REASON FOR EXAM: Microhematuria; recurrent UTI, bladder pain;  N39.0-Urinary tract infection, site not specified; R31.29-Other  microscopic hematuria; R39.82-Chronic bladder pain; Z87.448-Personal  history of other diseases of urinary system; Z72.0-Tobacco use    Scans were obtained through the kidneys without contrast and during  arterial venous and delayed phases.    FINDINGS:  The noncontrasted images showed no calcifications in the  intrarenal collecting systems. There was no hydronephrosis. Ureters were  not dilated as they course through the abdomen and pelvis. There were no  stones noted along the course of the ureters. Urinary bladder was empty  but otherwise unremarkable. Post contrasted scans show normal cortical  enhancement of the kidney. There was no evidence of renal mass or cyst.  The liver, spleen, and pancreas were unremarkable. The aorta was normal  in caliber. The delayed scans show no hydronephrosis. No masses were  identified in the urinary bladder.    Impression  Negative three-phase CT. A source for the patient's  hematuria and recurrent urinary tract infections was not identified.    3763.56 mGy.cm  The radiation dose reduction device was utilized for each scan per the  ALARA (as low as reasonably achievable) protocol.    This report was finalized on 3/29/2017 2:15 PM by Dr. Meet Jim II, MD.     CT Stone Protocol: No results found for this or any previous visit.     KUB: No results found for this or any previous visit.       Labs:  Brief Urine Lab Results       None          No visits with results within 3 Month(s) from this visit.   Latest known visit with results is:    Admission on 10/27/2021, Discharged on 10/27/2021   Component Date Value Ref Range Status    ABO Type 10/27/2021 O   Final    RH type 10/27/2021 Negative   Final    HCG, Urine, QL 10/27/2021 Negative  Negative Final    Lot Number 10/27/2021 [id number]   Final    Internal Positive Control 10/27/2021 Positive  Positive, Passed Final    Internal Negative Control 10/27/2021 Negative  Negative, Passed Final    Expiration Date 10/27/2021 02/28/23   Final        Procedure:  Procedures none    I have reviewed and agree with the above PMH, PSH, FMH, social history, medications, allergies, and labs.     Assessment/Plan:   Problem List Items Addressed This Visit    None  Visit Diagnoses       Interstitial cystitis    -  Primary    Relevant Medications    amitriptyline (ELAVIL) 10 MG tablet    Frequency of micturition        Relevant Orders    Urine Culture - Urine, Urine, Clean Catch            Health Maintenance:   Health Maintenance Due   Topic Date Due    BMI FOLLOWUP  Never done    HEPATITIS C SCREENING  Never done    ANNUAL PHYSICAL  Never done    PAP SMEAR  Never done    INFLUENZA VACCINE  Never done    COVID-19 Vaccine (3 - 2023-24 season) 09/01/2023        Smoking Counseling: Former smoker.  Never used smokeless tobacco.  Counseling given.    Urine Incontinence: Patient reports that she is not currently experiencing any symptoms of urinary incontinence.    Patient was given instructions and counseling regarding her condition or for health maintenance advice. Please see specific information pulled into the AVS if appropriate.    Patient Education:   Interstitial cystitis-we discussed the diagnosis and management of this condition.  I indicated that it was a multifactorial condition with multifactorial symptomatology.  Risks include frequency, urgency, the sensation of urinary tract infection in the absence of a positive culture, and sexual symptomatology including significant dyspareunia.  We discussed treatment  options including the importance of making a clinical diagnosis and the cystoscopic findings including Hunner's ulcers, etc.  We also discussed medical management including pharmacologic treatment such as amitriptyline, naturopathic treatments such as pumpkin oil, and more aggressive options of Botox injections, as well as the relative risks and merits of this.  We also discussed the use of dietary manipulation including the over-the-counter product relief which basically decreases the acid in the urine and avoidance of ascitic-containing foods such as citrus, etc. given patient's current symptomology at this time recommending a trial of amitriptyline 10 mg once daily.  Advised her that given current use of Remeron nightly she should take these roughly 12 hours apart.  Advised her this can increase risk for serotonin syndrome.  Discussed the risk and benefits of this with the patient.  Also discussed the use of a cystoscopy for further evaluation and definite diagnosis of interstitial cystitis.  Also discussed of other forms of treatment including anticholinergic/beta 3 agonist versus surgical interventions with Botox/bladder cystoscopy hydrodistention.  Patient will hold off on beta 3 agonist at this time.  Advised her to discontinue amitriptyline if she experiences any severe side effects.  Did educate patient to keep a daily diary laminating any factors contributing to lower urinary tract symptoms.  Patient verbalized understanding.  Gross hematuria  -I discussed with the patient pathophysiology and causes of gross hematuria which can include but are not limited to urinary tract infection, interstitial cystitis, nephrolithiasis, renal cell carcinoma, bladder cancer, bladder diverticula, sexually transmitted infections, or other urological abnormalities.  Patient's gross hematuria was most likely secondary to a kidney stone.  Discussed the causes kidney stones advising patient that 80% of kidney stones are calcium  based.  Advised her to increase water intake daily and avoid dehydration.  Will schedule the patient for a follow-up in roughly 1 month for reevaluation.  Advised to return to clinic sooner if needed.    Visit Diagnoses:    ICD-10-CM ICD-9-CM   1. Interstitial cystitis  N30.10 595.1   2. Frequency of micturition  R35.0 788.41       Meds Ordered During Visit:  New Medications Ordered This Visit   Medications    amitriptyline (ELAVIL) 10 MG tablet     Sig: Take 1 tablet by mouth Daily.     Dispense:  30 tablet     Refill:  1       Follow Up Appointment: 1 month  No follow-ups on file.      This document has been electronically signed by Drew Taylor PA-C   November 27, 2023 14:21 EST    Part of this note may be an electronic transcription/translation of spoken language to printed text using the Dragon Dictation System.

## 2023-11-29 ENCOUNTER — TELEPHONE (OUTPATIENT)
Dept: UROLOGY | Facility: CLINIC | Age: 33
End: 2023-11-29
Payer: MEDICAID

## 2023-11-29 DIAGNOSIS — N39.0 URINARY TRACT INFECTION WITHOUT HEMATURIA, SITE UNSPECIFIED: Primary | ICD-10-CM

## 2023-11-29 LAB — BACTERIA SPEC AEROBE CULT: ABNORMAL

## 2023-11-29 RX ORDER — LEVOFLOXACIN 250 MG/1
250 TABLET, FILM COATED ORAL DAILY
Qty: 3 TABLET | Refills: 0 | Status: SHIPPED | OUTPATIENT
Start: 2023-11-29 | End: 2023-12-02

## 2023-11-29 NOTE — TELEPHONE ENCOUNTER
Called patient to discuss urine culture results.  Advised her she was positive for group B strep.  I will send in Levaquin 250 mg once daily for 3 days.  Discussed the risk and benefits of this medication with the patient.  Patient verbalized understanding and agreed to plan of care.

## 2024-01-23 ENCOUNTER — TELEPHONE (OUTPATIENT)
Dept: GASTROENTEROLOGY | Facility: CLINIC | Age: 34
End: 2024-01-23
Payer: MEDICAID

## 2024-01-23 DIAGNOSIS — N30.10 INTERSTITIAL CYSTITIS: ICD-10-CM

## 2024-01-23 RX ORDER — AMITRIPTYLINE HYDROCHLORIDE 10 MG/1
10 TABLET, FILM COATED ORAL DAILY
Qty: 30 TABLET | Refills: 1 | Status: SHIPPED | OUTPATIENT
Start: 2024-01-23

## 2024-01-23 NOTE — TELEPHONE ENCOUNTER
Pharmacy: [facility]    Patient will run out of amitriptyline before her appointment 2/5/2024 can we send in refil to above pharmacy.

## 2024-05-10 ENCOUNTER — HOSPITAL ENCOUNTER (OUTPATIENT)
Dept: CT IMAGING | Facility: HOSPITAL | Age: 34
Discharge: HOME | End: 2024-05-10
Payer: MEDICAID

## 2024-05-10 ENCOUNTER — OFFICE VISIT (OUTPATIENT)
Dept: UROLOGY | Facility: CLINIC | Age: 34
End: 2024-05-10
Payer: MEDICAID

## 2024-05-10 DIAGNOSIS — N32.81 DETRUSOR INSTABILITY OF BLADDER: ICD-10-CM

## 2024-05-10 DIAGNOSIS — R10.30 LOWER ABDOMINAL PAIN: ICD-10-CM

## 2024-05-10 DIAGNOSIS — N30.21 CHRONIC CYSTITIS WITH HEMATURIA: ICD-10-CM

## 2024-05-10 DIAGNOSIS — R31.0 GROSS HEMATURIA: Primary | ICD-10-CM

## 2024-05-10 DIAGNOSIS — K58.2 IRRITABLE BOWEL SYNDROME WITH BOTH CONSTIPATION AND DIARRHEA: ICD-10-CM

## 2024-05-10 DIAGNOSIS — R35.0 FREQUENCY OF MICTURITION: ICD-10-CM

## 2024-05-10 DIAGNOSIS — R33.9 INCOMPLETE EMPTYING OF BLADDER: ICD-10-CM

## 2024-05-10 DIAGNOSIS — N30.10 BLADDER PAIN SYNDROME: ICD-10-CM

## 2024-05-10 PROCEDURE — 25510000001 IOPAMIDOL 61 % SOLUTION: Performed by: NURSE PRACTITIONER

## 2024-05-10 PROCEDURE — 74178 CT ABD&PLV WO CNTR FLWD CNTR: CPT

## 2024-05-10 PROCEDURE — 87147 CULTURE TYPE IMMUNOLOGIC: CPT | Performed by: NURSE PRACTITIONER

## 2024-05-10 PROCEDURE — 87086 URINE CULTURE/COLONY COUNT: CPT | Performed by: NURSE PRACTITIONER

## 2024-05-10 RX ORDER — LAMOTRIGINE 25 MG/1
1 TABLET ORAL EVERY 12 HOURS SCHEDULED
COMMUNITY
Start: 2024-05-03

## 2024-05-10 RX ORDER — GENTAMICIN 40 MG/ML
80 INJECTION, SOLUTION INTRAMUSCULAR; INTRAVENOUS ONCE
Status: COMPLETED | OUTPATIENT
Start: 2024-05-10 | End: 2024-05-10

## 2024-05-10 RX ORDER — NITROFURANTOIN 25; 75 MG/1; MG/1
100 CAPSULE ORAL 2 TIMES DAILY
Qty: 14 CAPSULE | Refills: 0 | Status: SHIPPED | OUTPATIENT
Start: 2024-05-10

## 2024-05-10 RX ORDER — PHENAZOPYRIDINE HYDROCHLORIDE 200 MG/1
200 TABLET, FILM COATED ORAL 3 TIMES DAILY PRN
Qty: 20 TABLET | Refills: 0 | Status: SHIPPED | OUTPATIENT
Start: 2024-05-10

## 2024-05-10 RX ADMIN — GENTAMICIN 80 MG: 40 INJECTION, SOLUTION INTRAMUSCULAR; INTRAVENOUS at 16:05

## 2024-05-10 RX ADMIN — IOPAMIDOL 100 ML: 612 INJECTION, SOLUTION INTRAVENOUS at 14:28

## 2024-05-10 NOTE — H&P (VIEW-ONLY)
Chief Complaint  Blood in Urine (6 MONTHS FOLLOW UP WITH GROSS HEMATURIA/ABD/FLANK/BLADDER PAIN/CHRONIC IC)    Subjective          Dawna Stevens presents to [facility] for GROSS HEMATURIA/ABD/FLANK/BLADDER PAIN/CHRONIC IC  History of Present Illness   History of Present Illness  The patient is a pleasant 33-year-old female who returns to clinic today for evaluation with complaints of Gross Hematuria. The patient was last evaluated in clinic on 11/23/2003 with similar complaints. At that time, she did see ROXANA Taylor with concerns of gross hematuria and persistent bladder pain.     She has a history of chronic interstitial cystitis, PCOS, PTSD, bipolar one disorder, and recurrent UTIs. Prior to her 2023 evaluation, she had been seen in clinic back in 2017 by Dr. Goddard with similar symptoms. She underwent a cystoscopic evaluation which Dr. Goddard THAT was completely negative and no abnormalities noted at that time. He had recommended Botox injections to the bladder; however, the patient was adamant and was lost to follow up until reevaluation in 2024. She did report a history of recurrent UTIs. Nevertheless, there is no positive documented bacterial growth in her urine samples the last 3 years. On clinic evaluation in 11/2023, the urine culture was negative for bacterial growth.     She returns to clinic today 05/10/24 FOR re-evaluation in apparent discomfort.The patient reports passing  numerous blood clots recently. She has severe bladder pain, bilateral flank pain, lower back pain. She reports bouts of urinary frequency, urgency, and burning with urination. She reports bladder pressure. She did take Pyridium yesterday secondary to worsening symptoms. Her urine will be sent out for culture today. The patient reported episodes of gross hematuria with numerous clots. A plan of care will include sending urine out for culture to rule out urinary tract infection, we will  get a stat CT.    Overall, The patient reports recurrent episodes of hematuria, characterized by clear and prolonged urine clots, occasionally presenting as bloody spots. She provided photographs on her phone with one visible clot. She urinated approximately 10 or 12 PM last night, some of which were more viscous and thinner. She is currently not menstruating and is not on birth control, having undergone tubal ligation. She describes her bladder as sore and bloated, but has not experienced any spasms today. She reports urinary frequency and urgency, which disrupted her sleep from 10:30 AM to 4:30 AM.     She noticed a couple of drops of urine followed by a blood clot, which kept her awake all night. She took Azo at 9:00 PM, and by 4:30 AM, her urine remained clear with no orange hue. She maintains adequate hydration, having eliminated coffee from her diet. She denies flank pain, nausea, vomiting, fevers, or chills. This is her first experience with similar symptoms. Her last pelvic and Pap smear were performed a month ago, both of which were negative.     She has a history of cloudy urine, spasms, and UTI symptoms, including stabbing pain in her left ovary. Her scans revealed bladder wall thickness and interstitial cystitis. She suspects she may have passed a kidney stone, despite having no prior history of kidney stones. She was prescribed Elavil for interstitial cystitis, which she takes once daily in the morning. Her general practitioner advised her to halve her Elavil dosage due to taking antidepressant medication-mirtazapine    The patient reports that she was also prescribed Lamictal to balance her depression. She has not undergone any cystoscopy or treatment for IC since 2017. She rates her pain as 12 or 13 out of 10. She reports that Azo pills are the only effective relief she can get, but they do not reach down to her bladder until it is over. She urinates frequently without drinking 6 bottles of water. She  states that was diagnosed with IC by Dr. Taylor in 11/2023.     She is adopted, so she does not have/know any family history of bladder cancer.       IMPRESSION:CT ABDOMEN/PELVIS 05/10/24  1.  No renal or ureteral stones. No obstructive uropathy.  2.  No solid enhancing renal masses or renal perfusion defects  identified.  3.  There is mild urinary bladder wall thickening in part related to  incomplete distention but may also reflect changes of cystitis.  4.  L5 pars defects with minimal anterior spondylolisthesis of L5 on S1  and neuroforaminal stenosis at this level.  5. Other incidental/nonacute findings above  Active Ambulatory Problems     Diagnosis Date Noted    Lower abdominal pain 02/16/2017    Blood in feces 02/16/2017    Irritable bowel syndrome with both constipation and diarrhea 02/16/2017    Abdominal pain, epigastric 02/16/2017    Gross hematuria 05/10/2024    Chronic cystitis with hematuria 05/10/2024    Frequency of micturition 05/10/2024    Incomplete emptying of bladder 05/10/2024    Bladder pain syndrome 05/10/2024    Detrusor instability of bladder 05/10/2024     Resolved Ambulatory Problems     Diagnosis Date Noted    No Resolved Ambulatory Problems     Past Medical History:   Diagnosis Date    Abnormal Pap smear of cervix     Bipolar 1 disorder     History of transfusion     Interstitial cystitis     Kidney stone     PCOS (polycystic ovarian syndrome)     PTSD (post-traumatic stress disorder)     Pyelonephritis     Urinary incontinence     Urinary tract infection       Objective   Vital Signs:   There were no vitals taken for this visit.      ROS:   Review of Systems   Constitutional:  Positive for activity change, appetite change and unexpected weight gain. Negative for chills, diaphoresis, fatigue, fever and unexpected weight loss.   HENT:  Negative for congestion, ear discharge, ear pain, nosebleeds, rhinorrhea, sinus pressure and sore throat.    Eyes:  Negative for blurred vision, double  vision, photophobia, pain, redness and visual disturbance.   Respiratory:  Negative for apnea, cough, chest tightness, shortness of breath, wheezing and stridor.    Cardiovascular:  Negative for chest pain and palpitations.   Gastrointestinal:  Positive for abdominal distention and abdominal pain. Negative for constipation, diarrhea, nausea and vomiting.   Endocrine: Negative for polydipsia, polyphagia and polyuria.   Genitourinary:  Positive for dysuria, flank pain, frequency, hematuria, pelvic pain, pelvic pressure and urgency. Negative for decreased urine volume, difficulty urinating, dyspareunia, genital sores, urinary incontinence and vaginal discharge.   Musculoskeletal:  Negative for arthralgias, back pain and joint swelling.   Skin:  Negative for pallor, rash and wound.   Neurological:  Negative for dizziness, tremors, syncope, weakness, light-headedness, headache, memory problem and confusion.   Psychiatric/Behavioral:  Positive for sleep disturbance and stress. Negative for behavioral problems and decreased concentration.         Physical Exam  Constitutional:       General: She is in acute distress.      Appearance: She is well-developed. She is obese. She is ill-appearing.   HENT:      Head: Normocephalic and atraumatic.   Eyes:      Pupils: Pupils are equal, round, and reactive to light.   Neck:      Thyroid: No thyromegaly.      Trachea: No tracheal deviation.   Cardiovascular:      Rate and Rhythm: Normal rate and regular rhythm.      Heart sounds: No murmur heard.  Pulmonary:      Effort: Pulmonary effort is normal. No respiratory distress.      Breath sounds: Normal breath sounds. No stridor. No wheezing.   Abdominal:      General: Bowel sounds are normal. There is distension.      Palpations: Abdomen is soft.      Tenderness: There is abdominal tenderness. There is guarding.   Genitourinary:     Labia:         Right: No tenderness.         Left: No tenderness.       Vagina: Normal. No vaginal  discharge.      Comments: GROSS HEMATURIA, CLOTS, BLADDER PAIN  Musculoskeletal:         General: Tenderness present. No deformity. Normal range of motion.      Cervical back: Normal range of motion.   Skin:     General: Skin is warm and dry.      Capillary Refill: Capillary refill takes less than 2 seconds.      Coloration: Skin is pale.      Findings: No erythema or rash.   Neurological:      Mental Status: She is alert and oriented to person, place, and time.      Cranial Nerves: No cranial nerve deficit.      Sensory: No sensory deficit.      Motor: Weakness present.      Coordination: Coordination normal.   Psychiatric:         Behavior: Behavior normal.         Thought Content: Thought content normal.         Judgment: Judgment normal.        Physical Exam    Result Review :       Urine Culture          11/27/2023    15:36   Urine Culture   Urine Culture 50,000 CFU/mL Streptococcus agalactiae (Group B)           Assessment and Plan    Problem List Items Addressed This Visit          Gastrointestinal Abdominal     Lower abdominal pain    Relevant Orders    CT Abdomen Pelvis With & Without Contrast (Completed)    Irritable bowel syndrome with both constipation and diarrhea    Relevant Medications    linaclotide (Linzess) 145 MCG capsule capsule       Genitourinary and Reproductive     Gross hematuria - Primary    Relevant Medications    nitrofurantoin, macrocrystal-monohydrate, (Macrobid) 100 MG capsule    phenazopyridine (Pyridium) 200 MG tablet    Other Relevant Orders    CT Abdomen Pelvis With & Without Contrast (Completed)    Case Request (Completed)    Chronic cystitis with hematuria    Relevant Medications    nitrofurantoin, macrocrystal-monohydrate, (Macrobid) 100 MG capsule    phenazopyridine (Pyridium) 200 MG tablet    gentamicin (GARAMYCIN) injection 80 mg (Completed)    amitriptyline (ELAVIL) 10 MG tablet    Other Relevant Orders    CT Abdomen Pelvis With & Without Contrast (Completed)    Frequency of  micturition    Relevant Medications    nitrofurantoin, macrocrystal-monohydrate, (Macrobid) 100 MG capsule    phenazopyridine (Pyridium) 200 MG tablet    Other Relevant Orders    Urine Culture - Urine, Urine, Random Void    Case Request (Completed)    Incomplete emptying of bladder    Relevant Orders    Bladder Scan (Completed)    Detrusor instability of bladder    Relevant Medications    phenazopyridine (Pyridium) 200 MG tablet    amitriptyline (ELAVIL) 10 MG tablet    Other Relevant Orders    Case Request (Completed)       Other    Bladder pain syndrome    Relevant Medications    nitrofurantoin, macrocrystal-monohydrate, (Macrobid) 100 MG capsule    phenazopyridine (Pyridium) 200 MG tablet    linaclotide (Linzess) 145 MCG capsule capsule    amitriptyline (ELAVIL) 10 MG tablet    Other Relevant Orders    Case Request (Completed)       Assessment & Plan      Assessment    CHRONIC IC VS HEMORRHAGIC CYSTITIS/BLADDER PAIN/HEMATURIA  MS KAROLYN KAUFFMAN  is a pleasant 33-year-old female who returns to clinic today for evaluation with complaints of Gross Hematuria. She has a history of chronic interstitial cystitis, PCOS, PTSD, bipolar one disorder, and recurrent UTIs. The patient reports recurrent episodes of hematuria, characterized by clear and prolonged urine clots, occasionally presenting as bloody spots. She urinated approximately 10 or 12 PM last night, some of which were more viscous and thinner. She describes her bladder as sore and bloated, but has not experienced any spasms today. She reports urinary frequency and urgency, which disrupted her sleep from 10:30 AM to 4:30 AM. TOOK PYRIDIUM, SO URINE WILL BE CULTURED    Interstitial cystitis-we discussed the diagnosis and management of this condition.  I indicated that it was a multifactorial condition with multifactorial symptomatology, Including frequency, urgency, the sensation of urinary tract infection in the absence of a positive culture, sexual symptomatology  including significant dyspareunia.      We discussed treatment options including the importance of making a clinical diagnosis and the cystoscopic findings including Hunner's ulcers etc.  We also discussed medical management including pharmacologic treatment such as amitriptyline, naturopathic treatments such as pumpkin oil and which more aggressive options of Botox injections as well as the relative risks and merits of this.  We also discussed the use of dietary manipulation including the over-the-counter product relief which basically decreases the acid in the urine and avoidance of ascitic-containing foods such as citrus is etc.Sjogren's syndrome    Discussed Microscopic Hematuria with patient. She has been VERY Symptomatic for gross hematuria. However, pt educated on possible causes such as infection in the bladder, kidney, or bladder discomfort, trauma. vigorous exercise, different kinds of cancers, viral illness, such as hepatitis a virus that causes liver disease and inflammation of the liver and sexual activity.  WE Discussed the fact that there is about a 96% chance of a negative workup with episodes of microscopic hematuria and with much greater in the face of Gross hematuria.  We discussed the fact that this is a non-cumulative test.  In other words if there is hematuria next year I would recommend continuing to work up the condition because of the fact that neoplasms may be small at the first workup and easily are missed.   We discussed the fact that if there is any history of chronic kidney disease or risk factors such as diabetes for contrast a noncontrasted study will be utilized.  We will initiate an investigation.                                          PLAN  We will resend her urine for culture, I will call HER with results if any positive bacterial growth.    Gentamicin 80 mg IM x 1 dose given in clinic today.    She will continue Pyridium 200 mg as needed for bladder pain and discomfort     We  discussed Starting antibiotic therapy with CEFDINIR if any positive bacteria      I recommend concomitant probiotics with treatment with antibiotics to protect the rectal reservoir including over-the-counter yogurt preparations to tami oral pills containing the appropriate probiotics.    Increase p.o. fluid intake to at least 1 to 2 L daily avoiding bladder irritants such as citrusy, spicy and caffeine      We discussed the use of both an upper and lower tract investigation.  I discussed the fact that an upper tract investigation includes a  CT scan with contrast being the gold standard to diagnose the small neoplasms-has been scheduled stat CT    We discussed  lower tract investigation consisting of a cystoscopy due to positive history of smoking x20+ years-patient has been scheduled for cystoscopy bladder hydrodistention on procedure in [place] Dr. Bhatt on 05/22/2024    We discussed obtaining a CT urogram for her microhematuria if it persists.     If the patient's microscopic hematuria work-up is negative, per AUA guidelines I would recommend a repeat urinalysis via the patient's primary care provider in 12 months.  If the repeat urinalysis is positive, the patient and I will then need to have a shared decision-making conversation regarding further work-up versus observation, given the low likelihood of identifying etiology in this situation.  If patient were to develop gross hematuria in the interim, the patient would need a total re-evaluation.          She will follow-up in clinic postprocedure for reevaluation,    She may return sooner if need be    The patient is agreeable with plan of care  1.                      Gross hematuria/IC /SUMMARY/PLAN.  In light of the patient's history of numerous blood clots, an immediate CT scan will be conducted. The patient will persist with Pyridium therapy. An injection of gentamicin will be administered today, contingent on the urine culture results for a definitive plan  of care. The patient will also continue her Elavil regimen for IC. If necessary, a CT scan will be scheduled. If indicated, the patient will be scheduled for a cystoscopy and bladder hydrodistention procedure as an outpatient. The patient has been advised to maintain hydration and fluid intake, but should also avoid bladder irritants such as caffeine products, citrusy, and spicy foods.  Patient reports that she is not currently experiencing any symptoms of urinary incontinence.      Class 2 Severe Obesity (BMI >=35 and <=39.9). Obesity-related health conditions include the following: obstructive sleep apnea, hypertension, dyslipidemias, and GERD. Obesity is improving with lifestyle modifications. BMI is >30, We discussed portion control and increasing exercise.      RADIOLOGY (CT AND/OR KUB):    CT Abdomen and Pelvis: Results for orders placed during the hospital encounter of 03/29/17    CT Abdomen Pelvis With & Without Contrast    Narrative  CT ABDOMEN AND PELVIS W WO CONTRAST-    REASON FOR EXAM: Microhematuria; recurrent UTI, bladder pain;  N39.0-Urinary tract infection, site not specified; R31.29-Other  microscopic hematuria; R39.82-Chronic bladder pain; Z87.448-Personal  history of other diseases of urinary system; Z72.0-Tobacco use    Scans were obtained through the kidneys without contrast and during  arterial venous and delayed phases.    FINDINGS:  The noncontrasted images showed no calcifications in the  intrarenal collecting systems. There was no hydronephrosis. Ureters were  not dilated as they course through the abdomen and pelvis. There were no  stones noted along the course of the ureters. Urinary bladder was empty  but otherwise unremarkable. Post contrasted scans show normal cortical  enhancement of the kidney. There was no evidence of renal mass or cyst.  The liver, spleen, and pancreas were unremarkable. The aorta was normal  in caliber. The delayed scans show no hydronephrosis. No masses  were  identified in the urinary bladder.    Impression  Negative three-phase CT. A source for the patient's  hematuria and recurrent urinary tract infections was not identified.    3763.56 mGy.cm  The radiation dose reduction device was utilized for each scan per the  ALARA (as low as reasonably achievable) protocol.    This report was finalized on 3/29/2017 2:15 PM by Dr. Meet Jim II, MD.     CT Stone Protocol: No results found for this or any previous visit.     KUB: No results found for this or any previous visit.       [unfilled]  LABS (3 MONTHS):    No visits with results within 3 Month(s) from this visit.   Latest known visit with results is:   Lab on 11/27/2023   Component Date Value Ref Range Status    Urine Culture 11/27/2023 50,000 CFU/mL Streptococcus agalactiae (Group B) (A)   Final          Smoking Cessation Counseling:  Former smoker.  Patient does not currently use any tobacco products.     Follow Up   Return in about 12 days (around 5/22/2024) for Next scheduled follow up, With Dr. Bhatt, Cystoscopy WITH BLADDER HYDRODISTENTION/IC/HEMATURIA.    Patient was given instructions and counseling regarding her condition or for health maintenance advice. Please see specific information pulled into the AVS if appropriate.          This document has been electronically signed by Griselda Cheng-Akwa, APRN   May 11, 2024 12:08 EDT      Dictated Utilizing Dragon Dictation: Part of this note may be an electronic transcription/translation of spoken language to printed text using the Dragon Dictation System.      Patient or patient representative verbalized consent for the use of Ambient Listening during the visit with  Griselda Cheng-Akwa, APRN for chart documentation. 5/11/2024  11:56 EDT

## 2024-05-11 PROBLEM — K58.2 IRRITABLE BOWEL SYNDROME WITH BOTH CONSTIPATION AND DIARRHEA: Status: ACTIVE | Noted: 2017-02-16

## 2024-05-11 PROBLEM — R10.30 LOWER ABDOMINAL PAIN: Status: ACTIVE | Noted: 2017-02-16

## 2024-05-11 LAB — BACTERIA SPEC AEROBE CULT: ABNORMAL

## 2024-05-11 RX ORDER — AMITRIPTYLINE HYDROCHLORIDE 10 MG/1
10 TABLET, FILM COATED ORAL DAILY
Qty: 30 TABLET | Refills: 1 | Status: SHIPPED | OUTPATIENT
Start: 2024-05-11

## 2024-05-13 ENCOUNTER — TELEPHONE (OUTPATIENT)
Dept: UROLOGY | Facility: CLINIC | Age: 34
End: 2024-05-13
Payer: MEDICAID

## 2024-05-13 NOTE — TELEPHONE ENCOUNTER
Patient called stating that her linzess needed a PA but since it was only for 8 days worth I told her she could  samples instead.

## 2024-05-15 ENCOUNTER — TELEPHONE (OUTPATIENT)
Dept: UROLOGY | Facility: CLINIC | Age: 34
End: 2024-05-15
Payer: MEDICAID

## 2024-05-15 NOTE — TELEPHONE ENCOUNTER
PA for Linzess has been sent to 's insurance company and APPROVED!!                Approved today  The request has been approved. The authorization is effective from 05/15/2024 to 05/15/2025, as long as the member is enrolled in their current health plan. A written notification letter will follow with additional details.  Authorization Expiration Date: 5/14/2025  Drug  Linzess 145MCG capsules  ePA cloud logo  Form  [employer] [place] Medicaid ePA Form 2017 NCPDP  Original Claim Info  75

## 2024-05-16 ENCOUNTER — TELEPHONE (OUTPATIENT)
Dept: UROLOGY | Facility: CLINIC | Age: 34
End: 2024-05-16
Payer: MEDICAID

## 2024-05-16 NOTE — TELEPHONE ENCOUNTER
Called patient to check on her post clinic visit and discuss urine culture results.      She is asymptomatic at this time.  Encouraged to continue prophylaxis with nitrofurantoin as discussed.  Scheduled for cystoscopy bladder hydrodistention with Dr. Bhatt 05/22/2024.  Will follow-up in clinic postprocedure.    Patient agreeable plan of care.      Urine Culture >100,000 CFU/mL Streptococcus agalactiae (Group B) Abnormal      This organism is considered to be universally susceptible to penicillin.  No further antibiotic testing will be performed.      Colonization of the urinary tract without infection is common. Treatment is discouraged unless the patient is symptomatic, pregnant, or undergoing an invasive urologic procedure.

## 2024-05-17 NOTE — DISCHARGE INSTRUCTIONS
HOLD all diabetic medications the morning of surgery as ordered by physician.    Please discontinue all blood thinners and anticoagulants (except aspirin) prior to surgery as per your surgeon and cardiologist instructions.  Aspirin may be continued up to the day prior to surgery.     5/22/24  ARRIVAL TIME PER  OFFICE    General Instructions:  Do not eat or drink after midnight:5/21/24  includes water, mints, or gum. You may brush your teeth.  Dental appliances that are removable must be taken out day of surgery.  Do not smoke, chew tobacco, or drink alcohol.  Bring medications in original bottles, any inhalers and if applicable your C-PAP/BI-PAP machine.  Bring any papers given to you in the doctor's office.  Wear clean comfortable clothes and socks.  Do not wear contact lenses or make-up. Bring a case for your glasses if applicable.  Bring crutches or walker if applicable.  Leave all other valuables and jewelry at home.    If you were given a blood bank ID arm band remember to bring it with you the day of surgery.    Preventing a Surgical Site Infection:  Shower the night before surgery (unless instructed other wise) using a fresh bar of anti-bacterial soap (such as Dial) and clean washcloth. Dry with a clean towel and dress in clean clothing.  For 2 to 3 days before surgery, avoid shaving with a razor near where you will have surgery because the razor can irritate skin and make it easier to develop an infection. Ask your surgeon if you will be receiving antibiotics prior to surgery.  Make sure you, your family, and all healthcare providers clear their hands with soap and water or an alcohol-based hand  before caring for you or your wound.  If at all possible, quit smoking as many days before surgery as you can.    Day of surgery:  Upon arrival, a Pre-op nurse and Anesthesiologist will review your health history, obtain vital signs, and answer questions you may have. The only belongings needed at this  time will be your home medications and if applicable your C-PAP/BI-PAP machine. If you are staying overnight your family can leave the rest of your belongings in the car and bring them to your room later. A Pre-op nurse will start an IV and you may receive medication in preparation for surgery, including something to help you relax. Your family will be able to see you in the Pre-op area. While you are in surgery your family should notify the waiting room  if they leave the waiting room area and provide a contact phone number.    Please be aware that surgery does come with discomfort. We want to make every effort to control your discomfort so please discuss any uncontrolled symptoms with your nurse. Your doctor will most likely have prescribed pain medications.    If you are going home after surgery you will receive individualized written care instructions before being discharged. A responsible adult must drive you to and from the hospital on the day of surgery and stay with you for 24 hours.    If you are staying overnight following surgery, you will be transported to your hospital room following the recovery period.     If you have any questions please call Pre-Admission Testing at [phone] ext [phone] Monday-Friday 8:00-3:00  Deductibles and co-payments are collected on the day of service. Please be prepared to pay the required co-pay, deductible or deposit on the day of service as defined by your plan.    A RESPONSIBLE PERSON MUST REMAIN IN THE WAITING ROOM DURING YOUR PROCEDURE AND A RESPONSIBLE  MUST BE AVAILABLE UPON YOUR DISCHARGE.

## 2024-05-20 ENCOUNTER — PRE-ADMISSION TESTING (OUTPATIENT)
Dept: PREADMISSION TESTING | Facility: HOSPITAL | Age: 34
End: 2024-05-20
Payer: MEDICAID

## 2024-05-20 LAB
ANION GAP SERPL CALCULATED.3IONS-SCNC: 10.7 MMOL/L (ref 5–15)
BUN SERPL-MCNC: 8 MG/DL (ref 6–20)
BUN/CREAT SERPL: 9.6 (ref 7–25)
CALCIUM SPEC-SCNC: 10.2 MG/DL (ref 8.6–10.5)
CHLORIDE SERPL-SCNC: 105 MMOL/L (ref 98–107)
CO2 SERPL-SCNC: 22.3 MMOL/L (ref 22–29)
CREAT SERPL-MCNC: 0.83 MG/DL (ref 0.57–1)
DEPRECATED RDW RBC AUTO: 44.5 FL (ref 37–54)
EGFRCR SERPLBLD CKD-EPI 2021: 94.4 ML/MIN/1.73
ERYTHROCYTE [DISTWIDTH] IN BLOOD BY AUTOMATED COUNT: 14.1 % (ref 12.3–15.4)
GLUCOSE SERPL-MCNC: 99 MG/DL (ref 65–99)
HCT VFR BLD AUTO: 43.5 % (ref 34–46.6)
HGB BLD-MCNC: 13.4 G/DL (ref 12–15.9)
MCH RBC QN AUTO: 27 PG (ref 26.6–33)
MCHC RBC AUTO-ENTMCNC: 30.8 G/DL (ref 31.5–35.7)
MCV RBC AUTO: 87.5 FL (ref 79–97)
PLATELET # BLD AUTO: 408 10*3/MM3 (ref 140–450)
PMV BLD AUTO: 8.7 FL (ref 6–12)
POTASSIUM SERPL-SCNC: 3.7 MMOL/L (ref 3.5–5.2)
RBC # BLD AUTO: 4.97 10*6/MM3 (ref 3.77–5.28)
SODIUM SERPL-SCNC: 138 MMOL/L (ref 136–145)
WBC NRBC COR # BLD AUTO: 9.16 10*3/MM3 (ref 3.4–10.8)

## 2024-05-20 PROCEDURE — 36415 COLL VENOUS BLD VENIPUNCTURE: CPT

## 2024-05-20 PROCEDURE — 85027 COMPLETE CBC AUTOMATED: CPT

## 2024-05-20 PROCEDURE — 80048 BASIC METABOLIC PNL TOTAL CA: CPT

## 2024-05-22 ENCOUNTER — ANESTHESIA EVENT (OUTPATIENT)
Dept: PERIOP | Facility: HOSPITAL | Age: 34
End: 2024-05-22
Payer: MEDICAID

## 2024-05-22 ENCOUNTER — HOSPITAL ENCOUNTER (OUTPATIENT)
Facility: HOSPITAL | Age: 34
Setting detail: HOSPITAL OUTPATIENT SURGERY
Discharge: HOME | End: 2024-05-22
Attending: UROLOGY | Admitting: UROLOGY
Payer: MEDICAID

## 2024-05-22 ENCOUNTER — APPOINTMENT (OUTPATIENT)
Dept: GENERAL RADIOLOGY | Facility: HOSPITAL | Age: 34
End: 2024-05-22
Payer: MEDICAID

## 2024-05-22 ENCOUNTER — ANESTHESIA (OUTPATIENT)
Dept: PERIOP | Facility: HOSPITAL | Age: 34
End: 2024-05-22
Payer: MEDICAID

## 2024-05-22 VITALS
OXYGEN SATURATION: 99 % | HEIGHT: 66 IN | TEMPERATURE: 97.5 F | HEART RATE: 81 BPM | WEIGHT: 241 LBS | RESPIRATION RATE: 18 BRPM | BODY MASS INDEX: 38.73 KG/M2 | DIASTOLIC BLOOD PRESSURE: 65 MMHG | SYSTOLIC BLOOD PRESSURE: 115 MMHG

## 2024-05-22 DIAGNOSIS — N32.81 DETRUSOR INSTABILITY OF BLADDER: ICD-10-CM

## 2024-05-22 DIAGNOSIS — R35.0 FREQUENCY OF MICTURITION: ICD-10-CM

## 2024-05-22 DIAGNOSIS — N30.10 BLADDER PAIN SYNDROME: ICD-10-CM

## 2024-05-22 DIAGNOSIS — R31.0 GROSS HEMATURIA: ICD-10-CM

## 2024-05-22 LAB
B-HCG UR QL: NEGATIVE
EXPIRATION DATE: NORMAL
INTERNAL NEGATIVE CONTROL: NEGATIVE
INTERNAL POSITIVE CONTROL: POSITIVE
Lab: NORMAL

## 2024-05-22 PROCEDURE — 25010000002 KETOROLAC TROMETHAMINE PER 15 MG: Performed by: NURSE ANESTHETIST, CERTIFIED REGISTERED

## 2024-05-22 PROCEDURE — 25010000002 FENTANYL CITRATE (PF) 50 MCG/ML SOLUTION: Performed by: NURSE ANESTHETIST, CERTIFIED REGISTERED

## 2024-05-22 PROCEDURE — 25010000002 GENTAMICIN PER 80 MG: Performed by: UROLOGY

## 2024-05-22 PROCEDURE — 25810000003 LACTATED RINGERS PER 1000 ML: Performed by: NURSE ANESTHETIST, CERTIFIED REGISTERED

## 2024-05-22 PROCEDURE — 25010000002 ONDANSETRON PER 1 MG: Performed by: NURSE ANESTHETIST, CERTIFIED REGISTERED

## 2024-05-22 PROCEDURE — 25810000003 LACTATED RINGERS PER 1000 ML: Performed by: ANESTHESIOLOGY

## 2024-05-22 PROCEDURE — 81025 URINE PREGNANCY TEST: CPT | Performed by: ANESTHESIOLOGY

## 2024-05-22 PROCEDURE — 25010000002 PROPOFOL 200 MG/20ML EMULSION: Performed by: NURSE ANESTHETIST, CERTIFIED REGISTERED

## 2024-05-22 PROCEDURE — 25010000002 DEXAMETHASONE PER 1 MG: Performed by: NURSE ANESTHETIST, CERTIFIED REGISTERED

## 2024-05-22 PROCEDURE — 52260 CYSTOSCOPY AND TREATMENT: CPT | Performed by: UROLOGY

## 2024-05-22 PROCEDURE — 25010000002 MIDAZOLAM PER 1 MG: Performed by: NURSE ANESTHETIST, CERTIFIED REGISTERED

## 2024-05-22 RX ORDER — SODIUM CHLORIDE 9 MG/ML
40 INJECTION, SOLUTION INTRAVENOUS AS NEEDED
Status: DISCONTINUED | OUTPATIENT
Start: 2024-05-22 | End: 2024-05-22 | Stop reason: HOSPADM

## 2024-05-22 RX ORDER — LIDOCAINE HYDROCHLORIDE 20 MG/ML
JELLY TOPICAL AS NEEDED
Status: DISCONTINUED | OUTPATIENT
Start: 2024-05-22 | End: 2024-05-22 | Stop reason: HOSPADM

## 2024-05-22 RX ORDER — IPRATROPIUM BROMIDE AND ALBUTEROL SULFATE 2.5; .5 MG/3ML; MG/3ML
3 SOLUTION RESPIRATORY (INHALATION) ONCE AS NEEDED
Status: DISCONTINUED | OUTPATIENT
Start: 2024-05-22 | End: 2024-05-22 | Stop reason: HOSPADM

## 2024-05-22 RX ORDER — MIDAZOLAM HYDROCHLORIDE 1 MG/ML
INJECTION INTRAMUSCULAR; INTRAVENOUS AS NEEDED
Status: DISCONTINUED | OUTPATIENT
Start: 2024-05-22 | End: 2024-05-22 | Stop reason: SURG

## 2024-05-22 RX ORDER — KETOROLAC TROMETHAMINE 30 MG/ML
30 INJECTION, SOLUTION INTRAMUSCULAR; INTRAVENOUS EVERY 6 HOURS PRN
Status: DISCONTINUED | OUTPATIENT
Start: 2024-05-22 | End: 2024-05-22 | Stop reason: HOSPADM

## 2024-05-22 RX ORDER — MIDAZOLAM HYDROCHLORIDE 1 MG/ML
1 INJECTION INTRAMUSCULAR; INTRAVENOUS
Status: DISCONTINUED | OUTPATIENT
Start: 2024-05-22 | End: 2024-05-22 | Stop reason: HOSPADM

## 2024-05-22 RX ORDER — HYDROCODONE BITARTRATE AND ACETAMINOPHEN 10; 325 MG/1; MG/1
1 TABLET ORAL EVERY 6 HOURS PRN
Qty: 16 TABLET | Refills: 0 | Status: SHIPPED | OUTPATIENT
Start: 2024-05-22

## 2024-05-22 RX ORDER — ONDANSETRON 2 MG/ML
INJECTION INTRAMUSCULAR; INTRAVENOUS AS NEEDED
Status: DISCONTINUED | OUTPATIENT
Start: 2024-05-22 | End: 2024-05-22 | Stop reason: SURG

## 2024-05-22 RX ORDER — GENTAMICIN SULFATE 80 MG/100ML
80 INJECTION, SOLUTION INTRAVENOUS ONCE
Status: COMPLETED | OUTPATIENT
Start: 2024-05-22 | End: 2024-05-22

## 2024-05-22 RX ORDER — FENTANYL CITRATE 50 UG/ML
50 INJECTION, SOLUTION INTRAMUSCULAR; INTRAVENOUS
Status: DISCONTINUED | OUTPATIENT
Start: 2024-05-22 | End: 2024-05-22 | Stop reason: HOSPADM

## 2024-05-22 RX ORDER — PROPOFOL 10 MG/ML
INJECTION, EMULSION INTRAVENOUS AS NEEDED
Status: DISCONTINUED | OUTPATIENT
Start: 2024-05-22 | End: 2024-05-22 | Stop reason: SURG

## 2024-05-22 RX ORDER — SODIUM CHLORIDE, SODIUM LACTATE, POTASSIUM CHLORIDE, CALCIUM CHLORIDE 600; 310; 30; 20 MG/100ML; MG/100ML; MG/100ML; MG/100ML
100 INJECTION, SOLUTION INTRAVENOUS ONCE AS NEEDED
Status: DISCONTINUED | OUTPATIENT
Start: 2024-05-22 | End: 2024-05-22 | Stop reason: HOSPADM

## 2024-05-22 RX ORDER — SODIUM CHLORIDE 0.9 % (FLUSH) 0.9 %
10 SYRINGE (ML) INJECTION AS NEEDED
Status: DISCONTINUED | OUTPATIENT
Start: 2024-05-22 | End: 2024-05-22 | Stop reason: HOSPADM

## 2024-05-22 RX ORDER — FAMOTIDINE 10 MG/ML
INJECTION, SOLUTION INTRAVENOUS AS NEEDED
Status: DISCONTINUED | OUTPATIENT
Start: 2024-05-22 | End: 2024-05-22 | Stop reason: SURG

## 2024-05-22 RX ORDER — MEPERIDINE HYDROCHLORIDE 25 MG/ML
12.5 INJECTION INTRAMUSCULAR; INTRAVENOUS; SUBCUTANEOUS
Status: DISCONTINUED | OUTPATIENT
Start: 2024-05-22 | End: 2024-05-22 | Stop reason: HOSPADM

## 2024-05-22 RX ORDER — SODIUM CHLORIDE, SODIUM LACTATE, POTASSIUM CHLORIDE, CALCIUM CHLORIDE 600; 310; 30; 20 MG/100ML; MG/100ML; MG/100ML; MG/100ML
125 INJECTION, SOLUTION INTRAVENOUS ONCE
Status: COMPLETED | OUTPATIENT
Start: 2024-05-22 | End: 2024-05-22

## 2024-05-22 RX ORDER — KETOROLAC TROMETHAMINE 30 MG/ML
INJECTION, SOLUTION INTRAMUSCULAR; INTRAVENOUS AS NEEDED
Status: DISCONTINUED | OUTPATIENT
Start: 2024-05-22 | End: 2024-05-22 | Stop reason: SURG

## 2024-05-22 RX ORDER — OXYCODONE HYDROCHLORIDE AND ACETAMINOPHEN 5; 325 MG/1; MG/1
1 TABLET ORAL ONCE AS NEEDED
Status: DISCONTINUED | OUTPATIENT
Start: 2024-05-22 | End: 2024-05-22 | Stop reason: HOSPADM

## 2024-05-22 RX ORDER — FENTANYL CITRATE 50 UG/ML
INJECTION, SOLUTION INTRAMUSCULAR; INTRAVENOUS AS NEEDED
Status: DISCONTINUED | OUTPATIENT
Start: 2024-05-22 | End: 2024-05-22 | Stop reason: SURG

## 2024-05-22 RX ORDER — SODIUM CHLORIDE, SODIUM LACTATE, POTASSIUM CHLORIDE, CALCIUM CHLORIDE 600; 310; 30; 20 MG/100ML; MG/100ML; MG/100ML; MG/100ML
INJECTION, SOLUTION INTRAVENOUS CONTINUOUS PRN
Status: DISCONTINUED | OUTPATIENT
Start: 2024-05-22 | End: 2024-05-22 | Stop reason: SURG

## 2024-05-22 RX ORDER — ONDANSETRON 2 MG/ML
4 INJECTION INTRAMUSCULAR; INTRAVENOUS AS NEEDED
Status: DISCONTINUED | OUTPATIENT
Start: 2024-05-22 | End: 2024-05-22 | Stop reason: HOSPADM

## 2024-05-22 RX ORDER — DEXAMETHASONE SODIUM PHOSPHATE 4 MG/ML
INJECTION, SOLUTION INTRA-ARTICULAR; INTRALESIONAL; INTRAMUSCULAR; INTRAVENOUS; SOFT TISSUE AS NEEDED
Status: DISCONTINUED | OUTPATIENT
Start: 2024-05-22 | End: 2024-05-22 | Stop reason: SURG

## 2024-05-22 RX ORDER — SODIUM CHLORIDE 0.9 % (FLUSH) 0.9 %
10 SYRINGE (ML) INJECTION EVERY 12 HOURS SCHEDULED
Status: DISCONTINUED | OUTPATIENT
Start: 2024-05-22 | End: 2024-05-22 | Stop reason: HOSPADM

## 2024-05-22 RX ADMIN — MIDAZOLAM HYDROCHLORIDE 2 MG: 1 INJECTION, SOLUTION INTRAMUSCULAR; INTRAVENOUS at 12:16

## 2024-05-22 RX ADMIN — PROPOFOL 80 MG: 10 INJECTION, EMULSION INTRAVENOUS at 12:11

## 2024-05-22 RX ADMIN — PROPOFOL 30 MG: 10 INJECTION, EMULSION INTRAVENOUS at 12:14

## 2024-05-22 RX ADMIN — PROPOFOL 20 MG: 10 INJECTION, EMULSION INTRAVENOUS at 12:18

## 2024-05-22 RX ADMIN — SODIUM CHLORIDE, POTASSIUM CHLORIDE, SODIUM LACTATE AND CALCIUM CHLORIDE: 600; 310; 30; 20 INJECTION, SOLUTION INTRAVENOUS at 12:06

## 2024-05-22 RX ADMIN — KETOROLAC TROMETHAMINE 30 MG: 30 INJECTION, SOLUTION INTRAMUSCULAR; INTRAVENOUS at 12:11

## 2024-05-22 RX ADMIN — SODIUM CHLORIDE, POTASSIUM CHLORIDE, SODIUM LACTATE AND CALCIUM CHLORIDE 125 ML/HR: 600; 310; 30; 20 INJECTION, SOLUTION INTRAVENOUS at 11:42

## 2024-05-22 RX ADMIN — FAMOTIDINE 20 MG: 10 INJECTION, SOLUTION INTRAVENOUS at 12:06

## 2024-05-22 RX ADMIN — PROPOFOL 20 MG: 10 INJECTION, EMULSION INTRAVENOUS at 12:16

## 2024-05-22 RX ADMIN — FENTANYL CITRATE 100 MCG: 50 INJECTION INTRAMUSCULAR; INTRAVENOUS at 12:06

## 2024-05-22 RX ADMIN — MIDAZOLAM HYDROCHLORIDE 2 MG: 1 INJECTION, SOLUTION INTRAMUSCULAR; INTRAVENOUS at 12:06

## 2024-05-22 RX ADMIN — ONDANSETRON 4 MG: 2 INJECTION INTRAMUSCULAR; INTRAVENOUS at 12:06

## 2024-05-22 RX ADMIN — DEXAMETHASONE SODIUM PHOSPHATE 4 MG: 4 INJECTION, SOLUTION INTRA-ARTICULAR; INTRALESIONAL; INTRAMUSCULAR; INTRAVENOUS; SOFT TISSUE at 12:11

## 2024-05-22 RX ADMIN — GENTAMICIN SULFATE 80 MG: 80 INJECTION, SOLUTION INTRAVENOUS at 12:06

## 2024-05-22 NOTE — ANESTHESIA PREPROCEDURE EVALUATION
Anesthesia Evaluation     Patient summary reviewed and Nursing notes reviewed   no history of anesthetic complications:   NPO Solid Status: > 8 hours  NPO Liquid Status: > 8 hours           Airway   Mallampati: II  TM distance: >3 FB  Neck ROM: full  No difficulty expected  Dental - normal exam     Pulmonary - negative pulmonary ROS and normal exam   Cardiovascular - negative cardio ROS and normal exam  Exercise tolerance: good (4-7 METS)    NYHA Classification: II  ECG reviewed  Patient on routine beta blocker and Beta blocker given within 24 hours of surgery  Rhythm: regular  Rate: normal        Neuro/Psych  (+) psychiatric history PTSD  GI/Hepatic/Renal/Endo    (+) obesity, GERD, renal disease (cystitis)-    Musculoskeletal (-) negative ROS    Abdominal  - normal exam    Bowel sounds: normal.   Substance History - negative use     OB/GYN negative ob/gyn ROS         Other - negative ROS       ROS/Med Hx Other:   Normal sinus rhythm with sinus arrhythmia  Poor R wave progression   Abnormal ECG  No previous ECGs available  Confirmed by Ajith Otto ([id number]) on 10/26/2021 8:27:59 PM                  Anesthesia Plan    ASA 3     general     intravenous induction     Anesthetic plan, risks, benefits, and alternatives have been provided, discussed and informed consent has been obtained with: patient.  Pre-procedure education provided  Use of blood products discussed with  Consented to blood products.    Plan discussed with CRNA.

## 2024-05-22 NOTE — ANESTHESIA POSTPROCEDURE EVALUATION
Patient: Dawna Stevens    Procedure Summary       Date: 05/22/24 Room / Location: [facility] OR 06 /  [facility]    Anesthesia Start: 1206 Anesthesia Stop: 1226    Procedures:       CYSTOSCOPY BLADDER HYDRODISTENSION      CYSTOSCOPY WITH URETHRAL DILATATION (Urethra) Diagnosis:       Gross hematuria      Frequency of micturition      Bladder pain syndrome      Detrusor instability of bladder      (Gross hematuria [R31.0])      (Frequency of micturition [R35.0])      (Bladder pain syndrome [N30.10])      (Detrusor instability of bladder [N32.81])    Surgeons: Cahrles Bhatt MD Provider: Sid Britt MD    Anesthesia Type: general ASA Status: 3            Anesthesia Type: general    Vitals  Vitals Value Taken Time   BP 97/63 05/22/24 1243   Temp 97.3 °F (36.3 °C) 05/22/24 1228   Pulse 79 05/22/24 1244   Resp 16 05/22/24 1243   SpO2 98 % 05/22/24 1244   Vitals shown include unfiled device data.        Post Anesthesia Care and Evaluation    Patient location during evaluation: PHASE II  Patient participation: complete - patient participated  Level of consciousness: awake and alert  Pain score: 1  Pain management: adequate    Airway patency: patent  Anesthetic complications: No anesthetic complications  PONV Status: controlled  Cardiovascular status: acceptable  Respiratory status: acceptable and room air  Hydration status: euvolemic  No anesthesia care post op

## 2024-05-22 NOTE — OP NOTE
CYSTOSCOPY BLADDER HYDRODISTENSION, CYSTOSCOPY WITH URETHRAL DILATATION  Procedure Note    Dawna Stevens  5/22/2024    Pre-op Diagnosis:   Gross hematuria [R31.0]  Frequency of micturition [R35.0]  Bladder pain syndrome [N30.10]  Detrusor instability of bladder [N32.81]    Post-op Diagnosis:     Post-Op Diagnosis Codes:     * Gross hematuria [R31.0]     * Frequency of micturition [R35.0]     * Bladder pain syndrome [N30.10]     * Detrusor instability of bladder [N32.81]    Procedure/CPT® Codes:    33-year-old white female with classic interstitial cystitis symptomatology here for dilation cystoscopy and hydraulic distention of the urinary bladder.  Patient was given an informed consent including the risks of anesthesia, bleeding, infection, perforation or rupture of the urinary bladder as well as lack of efficacy in the procedure.  Following an extensive informed consent, the patient was brought to the operative suite whereupon she underwent induction of general anesthesia and was placed on the low dorsolithotomy position.  The urethra was inspected and a 21 Khmer cystoscope was advanced into the urinary bladder showing normal orthotopic orifices in position and configuration.  The bladder was gently dilated at 50 mL/m using spinal manometric technique and carried out to over   mL.  Obvious glomerulations were noted.  The patient's capacity was noted to be  mL.  I then repeated the procedure ×2 and noted a bloody effluent of urine from the bladder consistent with an excellent treatment.  There was an obvious posterior wall Hunner's ulcer the patient was given antibiotics perioperatively and a B&O suppository was used for postop spasms.  Procedure(s):  CYSTOSCOPY BLADDER HYDRODISTENSION  CYSTOSCOPY WITH URETHRAL DILATATION    Surgeon(s):  Charles Bhatt MD    Anesthesia: see anesthesia record    Staff:   Circulator: Cheryl Aaron RN  Scrub Person: Tiffany Petty  Other: Kaity Ferreira  Lakhwinder    Estimated Blood Loss: none  Urine Voided: * No values recorded between 5/22/2024 12:07 PM and 5/22/2024 12:25 PM *    Specimens:                None      Drains: None    Findings: Lasix severe interstitial cystitis with Hunner's ulcer    Blood: N/A    Complications: None    Grafts and Implants: None    Charles Bhatt MD     Date: 5/22/2024  Time: 12:28 EDT

## 2024-05-23 ENCOUNTER — TELEPHONE (OUTPATIENT)
Dept: UROLOGY | Facility: CLINIC | Age: 34
End: 2024-05-23
Payer: MEDICAID

## 2024-05-23 NOTE — TELEPHONE ENCOUNTER
Caller: KAROLYN KAUFFMAN    Relationship: SELF    Best call back number: [phone]    What is the best time to reach you: ANYTIME    Who are you requesting to speak with (clinical staff, provider,  specific staff member): N/A    Do you know the name of the person who called: NO    What was the call regarding: INCOMING CALL FROM PT. PY SAYS SHE MISSED A CALL ABOUT 45 MINUTES AGO. NO TELEPHONE ENCOUNTERS IN CHART.    PT ALSO WANTING TO KNOW IF A BIOPSY WAS TAKEN YESTERDAY.    Is it okay if the provider responds through [facility]: NO

## 2024-05-24 NOTE — TELEPHONE ENCOUNTER
I asked around and no one had called I did see the pt has an appointment soon maybe they were calling about that

## 2024-06-04 ENCOUNTER — OFFICE VISIT (OUTPATIENT)
Dept: UROLOGY | Facility: CLINIC | Age: 34
End: 2024-06-04
Payer: MEDICAID

## 2024-06-04 VITALS
WEIGHT: 237.4 LBS | SYSTOLIC BLOOD PRESSURE: 117 MMHG | BODY MASS INDEX: 38.15 KG/M2 | HEIGHT: 66 IN | HEART RATE: 80 BPM | DIASTOLIC BLOOD PRESSURE: 87 MMHG

## 2024-06-04 DIAGNOSIS — N30.10 INTERSTITIAL CYSTITIS: Primary | ICD-10-CM

## 2024-06-04 PROCEDURE — 1160F RVW MEDS BY RX/DR IN RCRD: CPT | Performed by: UROLOGY

## 2024-06-04 PROCEDURE — 1159F MED LIST DOCD IN RCRD: CPT | Performed by: UROLOGY

## 2024-06-04 PROCEDURE — 99213 OFFICE O/P EST LOW 20 MIN: CPT | Performed by: UROLOGY

## 2024-06-04 NOTE — PROGRESS NOTES
Chief Complaint:      Chief Complaint   Patient presents with    Urethral dilatation      Surgery fu        HPI:   33 y.o. female status post urethral dilation on results of which she is dramatically improved neck step would be an injection of triamcinolone she persists with symptomatology she is to call me back in this regard.    Past Medical History:     Past Medical History:   Diagnosis Date    Abnormal Pap smear of cervix     Anxiety and depression     Bipolar 1 disorder     GERD (gastroesophageal reflux disease)     History of transfusion     Interstitial cystitis     Kidney stone     PCOS (polycystic ovarian syndrome)     PTSD (post-traumatic stress disorder)     Pyelonephritis     Urinary incontinence     Urinary tract infection        Current Meds:     Current Outpatient Medications   Medication Sig Dispense Refill    amitriptyline (ELAVIL) 10 MG tablet Take 1 tablet by mouth Daily. 30 tablet 1    dicyclomine (BENTYL) 20 MG tablet Take 1 tablet by mouth Every 6 (Six) Hours.      HYDROcodone-acetaminophen (NORCO)  MG per tablet Take 1 tablet by mouth Every 6 (Six) Hours As Needed for Moderate Pain. 16 tablet 0    lamoTRIgine (LaMICtal) 25 MG tablet Take 1 tablet by mouth Every 12 (Twelve) Hours.      metFORMIN (GLUCOPHAGE) 1000 MG tablet Take 1 tablet by mouth 2 (Two) Times a Day With Meals.      mirtazapine (REMERON) 15 MG tablet Take 2 tablets by mouth Every Night.  4    multivitamin with minerals (WOMENS ONE DAILY PO) Take 1 tablet by mouth Daily.      Omega-3 Fatty Acids (fish oil) 1000 MG capsule capsule Take 1,200 mg by mouth Daily With Breakfast.      phenazopyridine (Pyridium) 200 MG tablet Take 1 tablet by mouth 3 (Three) Times a Day As Needed for Bladder Spasms. 20 tablet 0    propranolol (INDERAL) 20 MG tablet Take 1 tablet by mouth 2 (Two) Times a Day.      topiramate (TOPAMAX) 50 MG tablet Take 1 tablet by mouth 2 (Two) Times a Day.       No current facility-administered medications for  this visit.        Allergies:      Allergies   Allergen Reactions    Latex Rash    Penicillins Nausea And Vomiting        Past Surgical History:     Past Surgical History:   Procedure Laterality Date     SECTION      x3    COLONOSCOPY      and EGD    CYSTOSCOPY N/A 2024    Procedure: CYSTOSCOPY WITH URETHRAL DILATATION;  Surgeon: Charles Bhatt MD;  Location: [facility];  Service: Urology;  Laterality: N/A;    CYSTOSCOPY BLADDER HYDRODISTENSION N/A 2024    Procedure: CYSTOSCOPY BLADDER HYDRODISTENSION;  Surgeon: Charles Bhatt MD;  Location: [facility];  Service: Urology;  Laterality: N/A;    DILATATION AND CURETTAGE      ENDOSCOPY      TUBAL COAGULATION LAPAROSCOPIC Bilateral 10/27/2021    Procedure: BILATERAL TUBAL FALLOPE FILSHIE CLIPPING LAPAROSCOPIC;  Surgeon: Don Arce DO;  Location: [facility];  Service: Obstetrics/Gynecology;  Laterality: Bilateral;    WISDOM TOOTH EXTRACTION         Social History:     Social History     Socioeconomic History    Marital status:    Tobacco Use    Smoking status: Former     Current packs/day: 0.00     Average packs/day: 1 pack/day for 5.0 years (5.0 ttl pk-yrs)     Types: Cigarettes     Start date: 10/1/2010     Quit date: 10/1/2015     Years since quittin.6    Smokeless tobacco: Never   Vaping Use    Vaping status: Never Used   Substance and Sexual Activity    Alcohol use: No    Drug use: No    Sexual activity: Defer     Birth control/protection: Tubal ligation       Family History:     Family History   Adopted: Yes   Family history unknown: Yes       Review of Systems:     Review of Systems   Constitutional: Negative.  Negative for activity change, appetite change, chills, diaphoresis, fatigue and unexpected weight change.   HENT:  Negative for congestion, dental problem, drooling, ear discharge, ear pain, facial swelling, hearing loss, mouth sores, nosebleeds, postnasal drip, rhinorrhea, sinus pressure, sneezing,  sore throat, tinnitus, trouble swallowing and voice change.    Eyes: Negative.  Negative for photophobia, pain, discharge, redness, itching and visual disturbance.   Respiratory: Negative.  Negative for apnea, cough, choking, chest tightness, shortness of breath, wheezing and stridor.    Cardiovascular: Negative.  Negative for chest pain, palpitations and leg swelling.   Gastrointestinal: Negative.  Negative for abdominal distention, abdominal pain, anal bleeding, blood in stool, constipation, diarrhea, nausea, rectal pain and vomiting.   Endocrine: Negative.  Negative for cold intolerance, heat intolerance, polydipsia, polyphagia and polyuria.   Musculoskeletal: Negative.  Negative for arthralgias, back pain, gait problem, joint swelling, myalgias, neck pain and neck stiffness.   Skin: Negative.  Negative for color change, pallor, rash and wound.   Allergic/Immunologic: Negative.  Negative for environmental allergies, food allergies and immunocompromised state.   Neurological: Negative.  Negative for dizziness, tremors, seizures, syncope, facial asymmetry, speech difficulty, weakness, light-headedness, numbness and headaches.   Hematological: Negative.  Negative for adenopathy. Does not bruise/bleed easily.   Psychiatric/Behavioral:  Negative for agitation, behavioral problems, confusion, decreased concentration, dysphoric mood, hallucinations, self-injury, sleep disturbance and suicidal ideas. The patient is not nervous/anxious and is not hyperactive.    All other systems reviewed and are negative.      Physical Exam:     Physical Exam  Constitutional:       Appearance: She is well-developed.   HENT:      Head: Normocephalic and atraumatic.      Right Ear: External ear normal.      Left Ear: External ear normal.   Eyes:      Conjunctiva/sclera: Conjunctivae normal.      Pupils: Pupils are equal, round, and reactive to light.   Cardiovascular:      Rate and Rhythm: Normal rate and regular rhythm.      Heart sounds:  Normal heart sounds.   Pulmonary:      Effort: Pulmonary effort is normal.      Breath sounds: Normal breath sounds.   Abdominal:      General: Bowel sounds are normal. There is no distension.      Palpations: Abdomen is soft. There is no mass.      Tenderness: There is no abdominal tenderness. There is no guarding or rebound.   Genitourinary:     Vagina: No vaginal discharge.   Musculoskeletal:         General: Normal range of motion.   Skin:     General: Skin is warm and dry.   Neurological:      Mental Status: She is alert.      Deep Tendon Reflexes: Reflexes are normal and symmetric.   Psychiatric:         Behavior: Behavior normal.         Thought Content: Thought content normal.         Judgment: Judgment normal.         I have reviewed the following portions of the patient's history: Allergies, current medications, past family history, past medical history, past social history, past surgical history, problem list, and ROS and confirm it is accurate.    Recent Image (CT and/or KUB):      CT Abdomen and Pelvis: Results for orders placed in visit on 05/10/24    CT Abdomen Pelvis With & Without Contrast    Narrative  EXAM:  CT Abdomen and Pelvis Without and With Intravenous Contrast    EXAM DATE:  5/10/2024 2:09 PM    CLINICAL HISTORY:  GROSS HEMATURIA/ABDOMINAL FLANK PAIN; R31.0-Gross hematuria;  N30.21-Other chronic cystitis with hematuria; R10.30-Lower abdominal  pain, unspecified    TECHNIQUE:  Axial computed tomography images of the abdomen and pelvis without and  with intravenous contrast.  Sagittal and coronal reformatted images were  created and reviewed.  This CT exam was performed using one or more of  the following dose reduction techniques:  automated exposure control,  adjustment of the mA and/or kV according to patient size, and/or use of  iterative reconstruction technique.    COMPARISON:  3/29/2017    FINDINGS:  Lung bases:  Lung bases are clear.  No consolidation.    ABDOMEN:  Liver:  Very mild  fatty infiltration of liver.  Gallbladder and bile ducts:  Unremarkable as visualized.  No calcified  stones.  No ductal dilation.  Pancreas:  Unremarkable as visualized.  No mass.  No ductal dilation.  Spleen:  Unremarkable as visualized.  No splenomegaly.  Adrenals:  Unremarkable as visualized.  No mass.  Kidneys and ureters:  Unremarkable as visualized.  No renal or  ureteral stones. No obstructive uropathy.  No solid enhancing renal  masses or renal perfusion defects identified.  Stomach and bowel:  Unremarkable as visualized.  No obstruction.  No  mucosal thickening.    PELVIS:  Appendix:  Normal appendix.  Bladder:  There is mild urinary bladder wall thickening in part  related to incomplete distention but may also reflect changes of  cystitis.  No stones.  Reproductive:  Bilateral tubal ligation clips.  Small left ovarian  cysts, simple in appearance and is 2.6 cm.    ABDOMEN and PELVIS:  Intraperitoneal space:  Unremarkable as visualized.  No significant  fluid collection.  No pneumoperitoneum identified.  Bones/joints:  L5 pars defects with minimal anterior spondylolisthesis  of L5 on S1 and neuroforaminal stenosis at this level.  No acute  fracture.  No dislocation.  Soft tissues:  Unremarkable as visualized.  Vasculature:  Unremarkable as visualized.  No abdominal aortic  aneurysm.  Lymph nodes:  Unremarkable as visualized.  No enlarged lymph nodes.    Impression  1.  No renal or ureteral stones. No obstructive uropathy.  2.  No solid enhancing renal masses or renal perfusion defects  identified.  3.  There is mild urinary bladder wall thickening in part related to  incomplete distention but may also reflect changes of cystitis.  4.  L5 pars defects with minimal anterior spondylolisthesis of L5 on S1  and neuroforaminal stenosis at this level.  5. Other incidental/nonacute findings above.      This report was finalized on 5/10/2024 2:34 PM by Dr. Renzo Khan MD.       CT Stone Protocol: No results  found for this or any previous visit.       KUB: No results found for this or any previous visit.       Labs (past 3 months):      Admission on 05/22/2024, Discharged on 05/22/2024   Component Date Value Ref Range Status    HCG, Urine, QL 05/22/2024 Negative  Negative Final    Lot Number 05/22/2024 [id number]   Final    Internal Positive Control 05/22/2024 Positive  Positive, Passed Final    Internal Negative Control 05/22/2024 Negative  Negative, Passed Final    Expiration Date 05/22/2024 2025-04-11   Final   Pre-Admission Testing on 05/20/2024   Component Date Value Ref Range Status    Glucose 05/20/2024 99  65 - 99 mg/dL Final    BUN 05/20/2024 8  6 - 20 mg/dL Final    Creatinine 05/20/2024 0.83  0.57 - 1.00 mg/dL Final    Sodium 05/20/2024 138  136 - 145 mmol/L Final    Potassium 05/20/2024 3.7  3.5 - 5.2 mmol/L Final    Chloride 05/20/2024 105  98 - 107 mmol/L Final    CO2 05/20/2024 22.3  22.0 - 29.0 mmol/L Final    Calcium 05/20/2024 10.2  8.6 - 10.5 mg/dL Final    BUN/Creatinine Ratio 05/20/2024 9.6  7.0 - 25.0 Final    Anion Gap 05/20/2024 10.7  5.0 - 15.0 mmol/L Final    eGFR 05/20/2024 94.4  >60.0 mL/min/1.73 Final    WBC 05/20/2024 9.16  3.40 - 10.80 10*3/mm3 Final    RBC 05/20/2024 4.97  3.77 - 5.28 10*6/mm3 Final    Hemoglobin 05/20/2024 13.4  12.0 - 15.9 g/dL Final    Hematocrit 05/20/2024 43.5  34.0 - 46.6 % Final    MCV 05/20/2024 87.5  79.0 - 97.0 fL Final    MCH 05/20/2024 27.0  26.6 - 33.0 pg Final    MCHC 05/20/2024 30.8 (L)  31.5 - 35.7 g/dL Final    RDW 05/20/2024 14.1  12.3 - 15.4 % Final    RDW-SD 05/20/2024 44.5  37.0 - 54.0 fl Final    MPV 05/20/2024 8.7  6.0 - 12.0 fL Final    Platelets 05/20/2024 408  140 - 450 10*3/mm3 Final   Office Visit on 05/10/2024   Component Date Value Ref Range Status    Urine Culture 05/10/2024 >100,000 CFU/mL Streptococcus agalactiae (Group B) (A)   Final      This organism is considered to be universally susceptible to penicillin.  No further antibiotic testing  will be performed.        Procedure:       Assessment/Plan:   Interstitial cystitis-we discussed the diagnosis and management of this condition.  I indicated that it was a multifactorial condition with multifactorial symptomatology.  Risks include frequency, urgency, the sensation of urinary tract infection in the absence of a positive culture, and sexual symptomatology including significant dyspareunia.  We discussed treatment options including the importance of making a clinical diagnosis and the cystoscopic findings including Hunner's ulcers, etc.  We also discussed medical management including pharmacologic treatment such as amitriptyline, naturopathic treatments such as pumpkin oil, and more aggressive options of Botox injections, as well as the relative risks and merits of this.  We also discussed the use of dietary manipulation including the over-the-counter product relief which basically decreases the acid in the urine and avoidance of ascitic-containing foods such as citrus, etc. as an obvious Hunner's ulcer she responded beautifully to the bladder dilation persists if bladder symptomatology persists I would recommend a repeat cystoscopy with an injection of the ulcer with triamcinolone.                 This document has been electronically signed by PITO GLASGOW MD June 4, 2024 14:25 EDT    Dictated Utilizing Dragon Dictation: Part of this note may be an electronic transcription/translation of spoken language to printed text using the Dragon Dictation System.  Answers submitted by the patient for this visit:  Other (Submitted on 5/30/2024)  Please describe your symptoms.: Post Op  Have you had these symptoms before?: No  How long have you been having these symptoms?: 1-2 weeks  Primary Reason for Visit (Submitted on 5/30/2024)  What is the primary reason for your visit?: Other

## 2024-06-14 ENCOUNTER — TELEPHONE (OUTPATIENT)
Dept: UROLOGY | Facility: CLINIC | Age: 34
End: 2024-06-14
Payer: MEDICAID

## 2024-06-14 NOTE — TELEPHONE ENCOUNTER
Caller: KAROLYN KAUFFMAN    Relationship: SELF    Best call back number: [phone]    Requested Prescriptions: LINZESS (linaclotide)  Requested Prescriptions      No prescriptions requested or ordered in this encounter        Pharmacy where request should be sent:      [facility], [place] - [address] - [phone]  - [phone] FX  [address] [facility] [address]  Phone: [phone] Fax: [phone]      Last office visit with prescribing clinician: 6/4/2024   Last telemedicine visit with prescribing clinician: Visit date not found   Next office visit with prescribing clinician: Visit date not found     Additional details provided by patient: PT NEEDING A REFILL OF THE LINZESS. PT ALSO SAID AS SOON AS SHE FINISHED HER ANTIBIOTICS SHE STARTED HAVING URINARY URGENCY AGAIN.    Does the patient have less than a 3 day supply:  [x] Yes  [] No    Would you like a call back once the refill request has been completed: [x] Yes [] No    If the office needs to give you a call back, can they leave a voicemail: [x] Yes [] No    Sil Copeland Rep   06/14/24 12:12 EDT

## 2024-06-17 DIAGNOSIS — N30.10 BLADDER PAIN SYNDROME: ICD-10-CM

## 2024-06-17 DIAGNOSIS — K58.2 IRRITABLE BOWEL SYNDROME WITH BOTH CONSTIPATION AND DIARRHEA: ICD-10-CM

## 2024-06-18 RX ORDER — LINACLOTIDE 145 UG/1
145 CAPSULE, GELATIN COATED ORAL
Qty: 30 CAPSULE | Refills: 0 | Status: SHIPPED | OUTPATIENT
Start: 2024-06-18

## 2024-07-22 DIAGNOSIS — N32.81 DETRUSOR INSTABILITY OF BLADDER: ICD-10-CM

## 2024-07-22 DIAGNOSIS — N30.21 CHRONIC CYSTITIS WITH HEMATURIA: ICD-10-CM

## 2024-07-22 DIAGNOSIS — N30.10 BLADDER PAIN SYNDROME: ICD-10-CM

## 2024-07-23 RX ORDER — AMITRIPTYLINE HYDROCHLORIDE 10 MG/1
10 TABLET, FILM COATED ORAL DAILY
Qty: 30 TABLET | Refills: 1 | Status: SHIPPED | OUTPATIENT
Start: 2024-07-23

## 2024-07-24 ENCOUNTER — OFFICE VISIT (OUTPATIENT)
Dept: UROLOGY | Facility: CLINIC | Age: 34
End: 2024-07-24
Payer: MEDICAID

## 2024-07-24 VITALS
SYSTOLIC BLOOD PRESSURE: 131 MMHG | WEIGHT: 226 LBS | BODY MASS INDEX: 36.32 KG/M2 | HEIGHT: 66 IN | DIASTOLIC BLOOD PRESSURE: 87 MMHG | HEART RATE: 93 BPM

## 2024-07-24 DIAGNOSIS — N32.81 DETRUSOR INSTABILITY OF BLADDER: ICD-10-CM

## 2024-07-24 DIAGNOSIS — R10.30 LOWER ABDOMINAL PAIN: ICD-10-CM

## 2024-07-24 DIAGNOSIS — N30.21 CHRONIC CYSTITIS WITH HEMATURIA: Primary | ICD-10-CM

## 2024-07-24 DIAGNOSIS — N30.10 BLADDER PAIN SYNDROME: ICD-10-CM

## 2024-07-24 LAB
BILIRUB BLD-MCNC: NEGATIVE MG/DL
CLARITY, POC: CLEAR
COLOR UR: YELLOW
EXPIRATION DATE: ABNORMAL
GLUCOSE UR STRIP-MCNC: NEGATIVE MG/DL
KETONES UR QL: NEGATIVE
LEUKOCYTE EST, POC: ABNORMAL
Lab: ABNORMAL
NITRITE UR-MCNC: NEGATIVE MG/ML
PH UR: 6 [PH] (ref 5–8)
PROT UR STRIP-MCNC: NEGATIVE MG/DL
RBC # UR STRIP: NEGATIVE /UL
SP GR UR: 1 (ref 1–1.03)
UROBILINOGEN UR QL: NORMAL

## 2024-07-24 PROCEDURE — 87086 URINE CULTURE/COLONY COUNT: CPT | Performed by: NURSE PRACTITIONER

## 2024-07-24 RX ORDER — BUSPIRONE HYDROCHLORIDE 10 MG/1
10 TABLET ORAL 2 TIMES DAILY
COMMUNITY
Start: 2024-06-17

## 2024-07-24 RX ORDER — NITROFURANTOIN 25; 75 MG/1; MG/1
100 CAPSULE ORAL 2 TIMES DAILY
Qty: 14 CAPSULE | Refills: 0 | Status: SHIPPED | OUTPATIENT
Start: 2024-07-24 | End: 2024-07-31

## 2024-07-24 RX ORDER — POLYETHYLENE GLYCOL 3350 17 G/17G
17 POWDER, FOR SOLUTION ORAL DAILY
Qty: 30 EACH | Refills: 3 | Status: SHIPPED | OUTPATIENT
Start: 2024-07-24

## 2024-07-24 RX ORDER — PHENAZOPYRIDINE HYDROCHLORIDE 200 MG/1
200 TABLET, FILM COATED ORAL 3 TIMES DAILY PRN
Qty: 20 TABLET | Refills: 0 | Status: SHIPPED | OUTPATIENT
Start: 2024-07-24

## 2024-07-24 NOTE — PROGRESS NOTES
Chief Complaint  Bladder spasms (6 WEEKS FOLLOW UP FOR CHRONIC IC/OAB/DI WITH BLADDER PAIN)    Subjective          Dawna Stevens presents to [facility] for  CHRONIC IC/OAB/DI WITH BLADDER PAIN POST HYDRODISTENTION  History of Present Illness   History of Present Illness  The patient is a pleasant 33-year-old female with a significant history of classic interstitial cystitis, OAB/DI complicated by bouts of urine frequency, urgency, and dysuria, who returns to clinic today for evaluation. This is an 8-week follow-up.  Patient reports persistent bladder spasms today.    The patient is post cystoscopy, bladder hydrodistention procedure and ureteral dilation completed by Dr. De Luna on 05/22/2024. This was secondary to bouts of bladder pain and discomfort. Per Dr. De Luna's evaluation, he was able to utilize a 21 Chinese cystoscope, which he advanced into the urinary bladder showing normal ortopic orifices in position and configuration. The bladder was gently dilated and 50 mL was instilled. The patient did tolerate procedure well and denied any postop complications.     She, however, returns to clinic today in apparent discomfort.  Patient reports intermittent bladder spasms that are recently becoming very bothersome to her. The patient reports inability to have a bowel movement for over 1 week. Today, I have discussed the impact of chronic idiopathic constipation on her bladder pain, pelvic pressure and suprapubic discomfort she is experiencing, and her worsening bladder spasms. She had been recommended Linzess 145 mcg and daily MiraLAX therapy for which she takes intermittently. We have discussed a GI referral in the past.    The patient reports that SHE began experiencing bladder spasms around 4:00 PM on Saturday, which she attempted to alleviate by drinking water. However, around 7:00 PM, she began to experience bladder pain, which she described as being located outside of  her back. She also noticed a malodorous urine, which progressively worsened. Over the next few days, she began experiencing frequent urination and passed a large clot.  Her urinalysis in clinic today does show 1+ leukocyte esterase, it is negative for nitrites, it is negative for gross/microscopic hematuria.  Her PVR is 0 mL.    Currently, she is able to urinate, albeit with constant straining. She also reports incomplete bladder emptying, lower pelvic pressure, and constipation. Her last bowel movement was this morning and last night, and prior to that, she had not had a bowel movement for 1.5 weeks. She is currently taking Linzess 145 mcg for her bowels, but does not have MiraLAX powder at home. She denies any dysuria, nausea, or vomiting, but experienced cold chills last night. She has never had an enema.     She is allergic to PENICILLIN.    IMPRESSION:CT ABDOMEN/PELVIS 05/10/24  1.  No renal or ureteral stones. No obstructive uropathy.  2.  No solid enhancing renal masses or renal perfusion defects identified.  3.  There is mild urinary bladder wall thickening in part related to incomplete distention but may also reflect changes of cystitis.  4.  L5 pars defects with minimal anterior spondylolisthesis of L5 on S1 and neuroforaminal stenosis at this level.  5. Other incidental/nonacute findings above  Active Ambulatory Problems     Diagnosis Date Noted    Lower abdominal pain 02/16/2017    Blood in feces 02/16/2017    Irritable bowel syndrome with both constipation and diarrhea 02/16/2017    Abdominal pain, epigastric 02/16/2017    Gross hematuria 05/10/2024    Chronic cystitis with hematuria 05/10/2024    Frequency of micturition 05/10/2024    Incomplete emptying of bladder 05/10/2024    Bladder pain syndrome 05/10/2024    Detrusor instability of bladder 05/10/2024     Resolved Ambulatory Problems     Diagnosis Date Noted    No Resolved Ambulatory Problems     Past Medical History:   Diagnosis Date    Abnormal  "Pap smear of cervix     Anxiety and depression     Bipolar 1 disorder     GERD (gastroesophageal reflux disease)     History of transfusion     Interstitial cystitis     Kidney stone     PCOS (polycystic ovarian syndrome)     PTSD (post-traumatic stress disorder)     Pyelonephritis     Urinary incontinence     Urinary tract infection       Objective   Vital Signs:   /87 (BP Location: Left arm, Patient Position: Sitting, Cuff Size: Adult)   Pulse 93   Ht 167.6 cm (65.98\")   Wt 103 kg (226 lb)   BMI 36.50 kg/m²       ROS:   Review of Systems   Constitutional:  Positive for chills, fatigue and unexpected weight gain. Negative for activity change and fever.   HENT: Negative.  Negative for congestion.    Eyes: Negative.  Negative for blurred vision.   Respiratory: Negative.     Cardiovascular: Negative.    Gastrointestinal:  Positive for abdominal distention, abdominal pain and constipation. Negative for nausea and vomiting.   Endocrine: Negative.    Genitourinary:  Positive for difficulty urinating, dysuria, flank pain, frequency, pelvic pain, pelvic pressure and urgency. Negative for dyspareunia, genital sores, hematuria, urinary incontinence and vaginal discharge.   Musculoskeletal:  Negative for back pain.   Skin: Negative.    Allergic/Immunologic: Negative.    Neurological: Negative.  Negative for dizziness, headache and confusion.   Hematological: Negative.    Psychiatric/Behavioral:  Positive for sleep disturbance and stress. Negative for behavioral problems and decreased concentration.         Physical Exam  Constitutional:       General: She is in acute distress.      Appearance: She is well-developed. She is obese. She is ill-appearing.   HENT:      Head: Normocephalic and atraumatic.   Eyes:      Pupils: Pupils are equal, round, and reactive to light.   Neck:      Thyroid: No thyromegaly.      Trachea: No tracheal deviation.   Cardiovascular:      Rate and Rhythm: Normal rate and regular rhythm.      " Heart sounds: No murmur heard.  Pulmonary:      Effort: Pulmonary effort is normal. No respiratory distress.      Breath sounds: Normal breath sounds. No stridor. No wheezing.   Abdominal:      General: Bowel sounds are normal. There is distension.      Palpations: Abdomen is soft.      Tenderness: There is abdominal tenderness. There is guarding.   Genitourinary:     Labia:         Right: No tenderness.         Left: No tenderness.       Vagina: Normal. No vaginal discharge.      Comments: BLADDER SPASMS  Musculoskeletal:         General: Tenderness present. No deformity. Normal range of motion.      Cervical back: Normal range of motion.   Skin:     General: Skin is warm and dry.      Capillary Refill: Capillary refill takes less than 2 seconds.      Coloration: Skin is not pale.      Findings: No erythema or rash.   Neurological:      Mental Status: She is alert and oriented to person, place, and time.      Cranial Nerves: No cranial nerve deficit.      Sensory: No sensory deficit.      Motor: Weakness present.      Coordination: Coordination normal.   Psychiatric:         Behavior: Behavior normal.         Thought Content: Thought content normal.         Judgment: Judgment normal.        Physical Exam    Result Review :     UA          7/24/2024    13:21   Urinalysis   Ketones, UA Negative    Leukocytes, UA Small (1+)      Urine Culture          11/27/2023    15:36 5/10/2024    13:19   Urine Culture   Urine Culture 50,000 CFU/mL Streptococcus agalactiae (Group B)  >100,000 CFU/mL Streptococcus agalactiae (Group B)             Assessment and Plan    Problem List Items Addressed This Visit          Gastrointestinal Abdominal     Lower abdominal pain    Relevant Medications    polyethylene glycol (MiraLax) 17 g packet       Genitourinary and Reproductive     Chronic cystitis with hematuria - Primary    Relevant Medications    nitrofurantoin, macrocrystal-monohydrate, (Macrobid) 100 MG capsule    phenazopyridine  (Pyridium) 200 MG tablet    Other Relevant Orders    POC Urinalysis Dipstick, Automated (Completed)    Urine Culture - Urine, Urine, Clean Catch    Detrusor instability of bladder    Relevant Medications    nitrofurantoin, macrocrystal-monohydrate, (Macrobid) 100 MG capsule    phenazopyridine (Pyridium) 200 MG tablet       Other    Bladder pain syndrome    Relevant Medications    nitrofurantoin, macrocrystal-monohydrate, (Macrobid) 100 MG capsule    phenazopyridine (Pyridium) 200 MG tablet       Assessment & Plan       Assessment    CHRONIC IC VS HEMORRHAGIC CYSTITIS/BLADDER PAIN/HEMATURIA  MS KAROLYN KAUFFMAN  is a pleasant 33-year-old female who returns to clinic today for evaluation with complaints of Gross Hematuria-resolved. She has a history of chronic interstitial cystitis, post bladder Botox, PCOS, PTSD, bipolar one disorder, and recurrent UTIs. The patient reports recurrent episodes of hematuria, characterized by clear and prolonged urine clots, occasionally presenting as bloody spots.     She urinated approximately 10 or 12 PM last night, some of which were more viscous and thinner. She describes her bladder as sore and bloated, but has not experienced any spasms today. She reports urinary frequency and urgency, which disrupted her sleep intermittently.    Interstitial cystitis-Again, we discussed the diagnosis and management of this condition.  I indicated that it was a multifactorial condition with multifactorial symptomatology, Including frequency, urgency, the sensation of urinary tract infection in the absence of a positive culture, sexual symptomatology including significant dyspareunia.       We discussed treatment options including the importance of making a clinical diagnosis and the cystoscopic findings including Hunner's ulcers etc.  We also discussed medical management including pharmacologic treatment such as amitriptyline, naturopathic treatments such as pumpkin oil and which more aggressive  options of Botox injections as well as the relative risks and merits of this.  We also discussed the use of dietary manipulation including the over-the-counter product relief which basically decreases the acid in the urine and avoidance of ascitic-containing foods such as citrus is etc.Sjogren's syndrome     Discussed Microscopic Hematuria with patient. She has been VERY Symptomatic for gross hematuria. However, pt educated on possible causes such as infection in the bladder, kidney, or bladder discomfort, trauma. vigorous exercise, different kinds of cancers, viral illness, such as hepatitis a virus that causes liver disease and inflammation of the liver and sexual activity.  WE Discussed the fact that there is about a 96% chance of a negative workup with episodes of microscopic hematuria and with much greater in the face of Gross hematuria.  We discussed the fact that this is a non-cumulative test.  In other words if there is hematuria next year I would recommend continuing to work up the condition because of the fact that neoplasms may be small at the first workup and easily are missed.   We discussed the fact that if there is any history of chronic kidney disease or risk factors such as diabetes for contrast a noncontrasted study will be utilized.  We will initiate an investigation.                                          PLAN  We will resend her urine for culture, I will call HER with results if any positive bacterial growth.     She will continue Pyridium 200 mg as needed for bladder pain and discomfort     We discussed Starting antibiotic therapy with Macrobid 100 mg p.o. twice daily pending urine culture.  Will stop if negative.      I recommend concomitant probiotics with treatment with antibiotics to protect the rectal reservoir including over-the-counter yogurt preparations to tami oral pills containing the appropriate probiotics.     Increase p.o. fluid intake to at least 1 to 2 L daily avoiding  bladder irritants such as citrusy, spicy and caffeine      We discussed the use of both an upper and lower tract investigation.  I discussed the fact that an upper tract investigation includes a  CT scan with contrast being the gold standard to diagnose the small neoplasms-has been scheduled stat CT     We discussed  lower tract investigation consisting of a cystoscopy due to positive history of smoking x20+ years-patient has been scheduled for cystoscopy bladder hydrodistention on procedure in [place] Dr. Bhatt on 05/22/2024     We discussed obtaining a CT urogram for her microhematuria if it persists.     If the patient's microscopic hematuria work-up is negative, per AUA guidelines I would recommend a repeat urinalysis via the patient's primary care provider in 12 months.  If the repeat urinalysis is positive, the patient and I will then need to have a shared decision-making conversation regarding further work-up versus observation, given the low likelihood of identifying etiology in this situation.  If patient were to develop gross hematuria in the interim, the patient would need a total re-evaluation.          She will follow-up in clinic postprocedure for reevaluation,     She may return sooner if need be     The patient is agreeable with plan of care    1. Urinary frequency, urgency, and dysuria./IC/IBS-C-summary   the patient's urine will be sent for culture alcohol results. If any positive bacterial growth is detected, along with 1+ leukocytes, a bowel regimen is recommended. The patient is currently on Linzess 145 mcg, prescribed by GI, but reports no improvement. A follow-up with GI is recommended for reevaluation. It is recommended that the patient add MiraLAX daily and take her Macrobid. Pending urine culture results for a definitive plan of care, she may return sooner if symptoms worsen.  Patient reports that she is not currently experiencing any symptoms of urinary incontinence.      RADIOLOGY (CT AND/OR  KUB):    CT Abdomen and Pelvis: Results for orders placed in visit on 05/10/24    CT Abdomen Pelvis With & Without Contrast    Narrative  EXAM:  CT Abdomen and Pelvis Without and With Intravenous Contrast    EXAM DATE:  5/10/2024 2:09 PM    CLINICAL HISTORY:  GROSS HEMATURIA/ABDOMINAL FLANK PAIN; R31.0-Gross hematuria;  N30.21-Other chronic cystitis with hematuria; R10.30-Lower abdominal  pain, unspecified    TECHNIQUE:  Axial computed tomography images of the abdomen and pelvis without and  with intravenous contrast.  Sagittal and coronal reformatted images were  created and reviewed.  This CT exam was performed using one or more of  the following dose reduction techniques:  automated exposure control,  adjustment of the mA and/or kV according to patient size, and/or use of  iterative reconstruction technique.    COMPARISON:  3/29/2017    FINDINGS:  Lung bases:  Lung bases are clear.  No consolidation.    ABDOMEN:  Liver:  Very mild fatty infiltration of liver.  Gallbladder and bile ducts:  Unremarkable as visualized.  No calcified  stones.  No ductal dilation.  Pancreas:  Unremarkable as visualized.  No mass.  No ductal dilation.  Spleen:  Unremarkable as visualized.  No splenomegaly.  Adrenals:  Unremarkable as visualized.  No mass.  Kidneys and ureters:  Unremarkable as visualized.  No renal or  ureteral stones. No obstructive uropathy.  No solid enhancing renal  masses or renal perfusion defects identified.  Stomach and bowel:  Unremarkable as visualized.  No obstruction.  No  mucosal thickening.    PELVIS:  Appendix:  Normal appendix.  Bladder:  There is mild urinary bladder wall thickening in part  related to incomplete distention but may also reflect changes of  cystitis.  No stones.  Reproductive:  Bilateral tubal ligation clips.  Small left ovarian  cysts, simple in appearance and is 2.6 cm.    ABDOMEN and PELVIS:  Intraperitoneal space:  Unremarkable as visualized.  No significant  fluid collection.  No  pneumoperitoneum identified.  Bones/joints:  L5 pars defects with minimal anterior spondylolisthesis  of L5 on S1 and neuroforaminal stenosis at this level.  No acute  fracture.  No dislocation.  Soft tissues:  Unremarkable as visualized.  Vasculature:  Unremarkable as visualized.  No abdominal aortic  aneurysm.  Lymph nodes:  Unremarkable as visualized.  No enlarged lymph nodes.    Impression  1.  No renal or ureteral stones. No obstructive uropathy.  2.  No solid enhancing renal masses or renal perfusion defects  identified.  3.  There is mild urinary bladder wall thickening in part related to  incomplete distention but may also reflect changes of cystitis.  4.  L5 pars defects with minimal anterior spondylolisthesis of L5 on S1  and neuroforaminal stenosis at this level.  5. Other incidental/nonacute findings above.    This report was finalized on 5/10/2024 2:34 PM by Dr. Renzo Khan MD.     CT Stone Protocol: No results found for this or any previous visit.     KUB: No results found for this or any previous visit.       [unfilled]  LABS (3 MONTHS):    Office Visit on 07/24/2024   Component Date Value Ref Range Status    Color 07/24/2024 Yellow  Yellow, Straw, Dark Yellow, Pam Final    Clarity, UA 07/24/2024 Clear  Clear Final    Specific Gravity  07/24/2024 1.000 (A)  1.005 - 1.030 Final    pH, Urine 07/24/2024 6.0  5.0 - 8.0 Final    Leukocytes 07/24/2024 Small (1+) (A)  Negative Final    Nitrite, UA 07/24/2024 Negative  Negative Final    Protein, POC 07/24/2024 Negative  Negative mg/dL Final    Glucose, UA 07/24/2024 Negative  Negative mg/dL Final    Ketones, UA 07/24/2024 Negative  Negative Final    Urobilinogen, UA 07/24/2024 Normal  Normal, 0.2 E.U./dL Final    Bilirubin 07/24/2024 Negative  Negative Final    Blood, UA 07/24/2024 Negative  Negative Final    Lot Number 07/24/2024 [id number]   Final    Expiration Date 07/24/2024 10/25/2024   Final   Admission on 05/22/2024, Discharged on 05/22/2024    Component Date Value Ref Range Status    HCG, Urine, QL 05/22/2024 Negative  Negative Final    Lot Number 05/22/2024 [id number]   Final    Internal Positive Control 05/22/2024 Positive  Positive, Passed Final    Internal Negative Control 05/22/2024 Negative  Negative, Passed Final    Expiration Date 05/22/2024 2025-04-11   Final   Pre-Admission Testing on 05/20/2024   Component Date Value Ref Range Status    Glucose 05/20/2024 99  65 - 99 mg/dL Final    BUN 05/20/2024 8  6 - 20 mg/dL Final    Creatinine 05/20/2024 0.83  0.57 - 1.00 mg/dL Final    Sodium 05/20/2024 138  136 - 145 mmol/L Final    Potassium 05/20/2024 3.7  3.5 - 5.2 mmol/L Final    Chloride 05/20/2024 105  98 - 107 mmol/L Final    CO2 05/20/2024 22.3  22.0 - 29.0 mmol/L Final    Calcium 05/20/2024 10.2  8.6 - 10.5 mg/dL Final    BUN/Creatinine Ratio 05/20/2024 9.6  7.0 - 25.0 Final    Anion Gap 05/20/2024 10.7  5.0 - 15.0 mmol/L Final    eGFR 05/20/2024 94.4  >60.0 mL/min/1.73 Final    WBC 05/20/2024 9.16  3.40 - 10.80 10*3/mm3 Final    RBC 05/20/2024 4.97  3.77 - 5.28 10*6/mm3 Final    Hemoglobin 05/20/2024 13.4  12.0 - 15.9 g/dL Final    Hematocrit 05/20/2024 43.5  34.0 - 46.6 % Final    MCV 05/20/2024 87.5  79.0 - 97.0 fL Final    MCH 05/20/2024 27.0  26.6 - 33.0 pg Final    MCHC 05/20/2024 30.8 (L)  31.5 - 35.7 g/dL Final    RDW 05/20/2024 14.1  12.3 - 15.4 % Final    RDW-SD 05/20/2024 44.5  37.0 - 54.0 fl Final    MPV 05/20/2024 8.7  6.0 - 12.0 fL Final    Platelets 05/20/2024 408  140 - 450 10*3/mm3 Final   Office Visit on 05/10/2024   Component Date Value Ref Range Status    Urine Culture 05/10/2024 >100,000 CFU/mL Streptococcus agalactiae (Group B) (A)   Final      This organism is considered to be universally susceptible to penicillin.  No further antibiotic testing will be performed.        Follow Up   Return in about 2 weeks (around 8/7/2024) for Next scheduled follow up, RECURRENT UTI/DYSURIA/DETRUSSOR INSTABILITY/CHRONIC IC-EVAL BLADDER  PAIN.    Patient was given instructions and counseling regarding her condition or for health maintenance advice. Please see specific information pulled into the AVS if appropriate.          This document has been electronically signed by Griselda Cheng-Akwa, APRN   July 24, 2024 19:38 EDT      Dictated Utilizing Dragon Dictation: Part of this note may be an electronic transcription/translation of spoken language to printed text using the Dragon Dictation System.      Patient or patient representative verbalized consent for the use of Ambient Listening during the visit with  Griselda Cheng-Akwa, APRN for chart documentation. 7/24/2024  19:38 EDT

## 2024-07-25 LAB — BACTERIA SPEC AEROBE CULT: ABNORMAL

## 2024-08-01 ENCOUNTER — TELEPHONE (OUTPATIENT)
Dept: UROLOGY | Facility: CLINIC | Age: 34
End: 2024-08-01
Payer: MEDICAID

## 2024-08-01 NOTE — TELEPHONE ENCOUNTER
----- Message from Griselda Cheng-Akwa sent at 8/1/2024  9:04 AM EDT -----  Please kindly let patient know her urine culture results showed gram-negative bacilli, otherwise are negative for any bacterial infection at this time.    Urine Culture - <5000 CFU/mL gram-negative bacilli    However she may drop off another urine sample if she feels symptomatic.    Continue antibiotic suppressive therapy 1 time nightly  ONLY if indicated.    If not increase p.o. fluid intake and follow-up in clinic as discussed.    THANK YOU

## 2024-08-07 ENCOUNTER — OFFICE VISIT (OUTPATIENT)
Dept: UROLOGY | Facility: CLINIC | Age: 34
End: 2024-08-07
Payer: MEDICAID

## 2024-08-07 VITALS
BODY MASS INDEX: 36.13 KG/M2 | SYSTOLIC BLOOD PRESSURE: 113 MMHG | HEIGHT: 66 IN | HEART RATE: 84 BPM | DIASTOLIC BLOOD PRESSURE: 58 MMHG | WEIGHT: 224.8 LBS

## 2024-08-07 DIAGNOSIS — N30.10 BLADDER PAIN SYNDROME: ICD-10-CM

## 2024-08-07 DIAGNOSIS — R35.0 FREQUENCY OF MICTURITION: ICD-10-CM

## 2024-08-07 DIAGNOSIS — R31.0 GROSS HEMATURIA: ICD-10-CM

## 2024-08-07 DIAGNOSIS — N32.81 DETRUSOR INSTABILITY OF BLADDER: ICD-10-CM

## 2024-08-07 DIAGNOSIS — N30.21 CHRONIC CYSTITIS WITH HEMATURIA: Primary | ICD-10-CM

## 2024-08-07 LAB
BILIRUB BLD-MCNC: NEGATIVE MG/DL
CLARITY, POC: CLEAR
COLOR UR: YELLOW
EXPIRATION DATE: NORMAL
GLUCOSE UR STRIP-MCNC: NEGATIVE MG/DL
KETONES UR QL: NEGATIVE
LEUKOCYTE EST, POC: NEGATIVE
Lab: NORMAL
NITRITE UR-MCNC: NEGATIVE MG/ML
PH UR: 5.5 [PH] (ref 5–8)
PROT UR STRIP-MCNC: NEGATIVE MG/DL
RBC # UR STRIP: NEGATIVE /UL
SP GR UR: 1.01 (ref 1–1.03)
UROBILINOGEN UR QL: NORMAL

## 2024-08-07 PROCEDURE — 87086 URINE CULTURE/COLONY COUNT: CPT | Performed by: NURSE PRACTITIONER

## 2024-08-07 NOTE — PROGRESS NOTES
Chief Complaint  Chronic cystitis with hematuria (2 WEEK FOLLOW UP FOR IC/OAB/DI WITH BLADDER SPASMS)    Subjective          Dawna Stevens presents to [facility] for  FOR IC/OAB/DI WITH BLADDER SPASMS-IMPROVED  History of Present Illness   History of Present Illness  The patient is a pleasant 33-year-old female who returns to clinic today for evaluation. This is a 2-week follow-up with prior concerns of chronic interstitial cystitis and overactive bladder/detrusor instability, complicated by bouts of urine frequency, urgency, constant burning with urination bouts of gross hematuria.     She is in no apparent discomfort today and reports significant improvement in her prior symptoms.  She denies any recent burning with urination, denies frequency, urgency, pelvic pain or suprapubic discomfort.  She denies flank pain, she does not have any hematuria episodes.    Last evaluated in clinic on 07/24/2024, the patient was in apparent discomfort.  She HAD reported persistent bladder pain, pressure, and bladder spasms with worsening hematuria-also resolved.  She was post cystoscopy, bladder hydrodistention procedure and urethral dilatation completed by Dr. Niño on 05/22/2024. This was secondary to her bladder pain that has been recalcitrant to oral medications.     She did relatively well post procedure, but had started having dysuria with chills concerning for UTIs. Urinalysis in clinic/urine culture was completely negative for any bacterial growth. She had been restarted on Macrobid prophylaxis for chronic interstitial cystitis, oxybutynin for overactive bladder/detrusor instability, and Pyridium for bladder pain and spasms.She returns today for follow-up, feeling better. The patient reports doing significantly better.Her urinalysis in clinic today is complete negative for leukocyte esterase, it is negative for nitrites, it is negative for gross/microscopic hematuria.  PVR is 0  "mL.    OVERALL, She reports an improvement in her condition. Her bowel movements have improved after taking MiraLAX for 3 days and Linzess 245 mcg for 2 days. She denies any blood in her urine.    IMPRESSION:05/10/2024  1.  No renal or ureteral stones. No obstructive uropathy.  2.  No solid enhancing renal masses or renal perfusion defects identified.  3.  There is mild urinary bladder wall thickening in part related to incomplete distention but may also reflect changes of cystitis.  4.  L5 pars defects with minimal anterior spondylolisthesis of L5 on S1 and neuro foraminal stenosis at this level.  5. Other incidental/nonacute findings above.    Active Ambulatory Problems     Diagnosis Date Noted    Lower abdominal pain 02/16/2017    Blood in feces 02/16/2017    Irritable bowel syndrome with both constipation and diarrhea 02/16/2017    Abdominal pain, epigastric 02/16/2017    Gross hematuria 05/10/2024    Chronic cystitis with hematuria 05/10/2024    Frequency of micturition 05/10/2024    Incomplete emptying of bladder 05/10/2024    Bladder pain syndrome 05/10/2024    Detrusor instability of bladder 05/10/2024     Resolved Ambulatory Problems     Diagnosis Date Noted    No Resolved Ambulatory Problems     Past Medical History:   Diagnosis Date    Abnormal Pap smear of cervix     Anxiety and depression     Bipolar 1 disorder     GERD (gastroesophageal reflux disease)     History of transfusion     Interstitial cystitis     Kidney stone     PCOS (polycystic ovarian syndrome)     PTSD (post-traumatic stress disorder)     Pyelonephritis     Urinary incontinence     Urinary tract infection       Objective   Vital Signs:   /58   Pulse 84   Ht 167.6 cm (65.98\")   Wt 102 kg (224 lb 12.8 oz)   BMI 36.31 kg/m²       ROS:   Review of Systems   Constitutional:  Positive for activity change, appetite change, fatigue and unexpected weight gain. Negative for chills, diaphoresis, fever and unexpected weight loss.   HENT:  " Negative for congestion, ear discharge, ear pain, nosebleeds, rhinorrhea, sinus pressure and sore throat.    Eyes:  Negative for blurred vision, double vision, photophobia, pain, redness and visual disturbance.   Respiratory:  Negative for apnea, cough, chest tightness, shortness of breath, wheezing and stridor.    Cardiovascular:  Negative for chest pain and palpitations.   Gastrointestinal:  Positive for abdominal distention, abdominal pain and constipation. Negative for diarrhea, nausea and vomiting.   Endocrine: Negative for polydipsia, polyphagia and polyuria.   Genitourinary:  Positive for dysuria, frequency, hematuria, pelvic pain, pelvic pressure and urgency. Negative for decreased urine volume, difficulty urinating, dyspareunia, flank pain, genital sores, urinary incontinence and vaginal discharge.   Musculoskeletal:  Positive for back pain and myalgias. Negative for arthralgias and joint swelling.   Skin:  Positive for dry skin and pallor. Negative for rash and wound.   Allergic/Immunologic: Negative for food allergies.   Neurological:  Positive for headache. Negative for dizziness, tremors, syncope, weakness, light-headedness, memory problem and confusion.   Hematological:  Does not bruise/bleed easily.   Psychiatric/Behavioral:  Positive for sleep disturbance and stress. Negative for behavioral problems and decreased concentration. The patient is nervous/anxious.         Physical Exam  Constitutional:       General: She is not in acute distress.     Appearance: She is well-developed. She is obese.   HENT:      Head: Normocephalic and atraumatic.   Eyes:      Pupils: Pupils are equal, round, and reactive to light.   Neck:      Thyroid: No thyromegaly.      Trachea: No tracheal deviation.   Cardiovascular:      Rate and Rhythm: Normal rate and regular rhythm.      Heart sounds: No murmur heard.  Pulmonary:      Effort: Pulmonary effort is normal. No respiratory distress.      Breath sounds: Normal breath  sounds. No stridor. No wheezing.   Abdominal:      General: Bowel sounds are normal. There is distension.      Palpations: Abdomen is soft.      Tenderness: There is abdominal tenderness.   Genitourinary:     Labia:         Right: No tenderness.         Left: No tenderness.       Vagina: Normal. No vaginal discharge.   Musculoskeletal:         General: Tenderness present. No deformity. Normal range of motion.      Cervical back: Normal range of motion.   Skin:     General: Skin is warm and dry.      Capillary Refill: Capillary refill takes less than 2 seconds.      Coloration: Skin is not pale.      Findings: No erythema or rash.   Neurological:      Mental Status: She is alert and oriented to person, place, and time.      Cranial Nerves: No cranial nerve deficit.      Sensory: No sensory deficit.      Motor: Weakness present.      Coordination: Coordination normal.   Psychiatric:         Behavior: Behavior normal.         Thought Content: Thought content normal.         Judgment: Judgment normal.        Physical Exam    Result Review :     UA          7/24/2024    13:21 8/7/2024    15:06   Urinalysis   Ketones, UA Negative  Negative    Leukocytes, UA Small (1+)  Negative      Urine Culture          11/27/2023    15:36 5/10/2024    13:19 7/24/2024    13:18   Urine Culture   Urine Culture 50,000 CFU/mL Streptococcus agalactiae (Group B)  >100,000 CFU/mL Streptococcus agalactiae (Group B)  <25,000 CFU/mL Gram Negative Bacilli           Assessment and Plan    Problem List Items Addressed This Visit          Genitourinary and Reproductive     Gross hematuria    Chronic cystitis with hematuria - Primary    Relevant Orders    POC Urinalysis Dipstick, Automated (Completed)    Urine Culture - Urine, Urine, Random Void    Frequency of micturition    Detrusor instability of bladder    Relevant Orders    POC Urinalysis Dipstick, Automated (Completed)    Urine Culture - Urine, Urine, Random Void       Other    Bladder pain  syndrome       Assessment & Plan         Assessment    CHRONIC IC VS HEMORRHAGIC CYSTITIS/BLADDER PAIN/HEMATURIA  MS KAROLYN KAUFFMAN  is a pleasant 33-year-old female who returns to clinic today for evaluation.  This is a 2 weeks follow-up with prior complaints of bladder pain and discomfort, acute cystitis with hematuria, urinary symptoms of frequency urgency and dysuria, gross Hematuria-resolved.      She was in apparent discomfort during last clinic evaluation but today reports a remarkable improvement in her prior symptoms.  She has a history of chronic interstitial cystitis, post bladder Botox, PCOS, PTSD, bipolar one disorder, and recurrent UTIs.  However her last urinalysis was negative for bacterial growth.  We did recheck her urine for infection secondary to patient reports of recurrent episodes of hematuria, characterized by clear and prolonged urine clots, occasionally presenting as bloody spots.  Her urine culture was also negative.  Her urinalysis today is also negative for leukocyte esterase, it is negative for nitrites, it is negative for gross/microscopic hematuria.  Her PVR is 0.     Interstitial cystitis-Again, we discussed the diagnosis and management of this condition.  I indicated that it was a multifactorial condition with multifactorial symptomatology, Including frequency, urgency, the sensation of urinary tract infection in the absence of a positive culture, sexual symptomatology including significant dyspareunia.       We discussed treatment options including the importance of making a clinical diagnosis and the cystoscopic findings including Hunner's ulcers etc.  We also discussed medical management including pharmacologic treatment such as amitriptyline, naturopathic treatments such as pumpkin oil and which more aggressive options of Botox injections as well as the relative risks and merits of this.  We also discussed the use of dietary manipulation including the over-the-counter product relief  which basically decreases the acid in the urine and avoidance of ascitic-containing foods such as citrus is etc.Sjogren's syndrome     Discussed Microscopic Hematuria with patient. She has been VERY Symptomatic for gross hematuria. However, pt educated on possible causes such as infection in the bladder, kidney, or bladder discomfort, trauma. vigorous exercise, different kinds of cancers, viral illness, such as hepatitis a virus that causes liver disease and inflammation of the liver and sexual activity.  WE Discussed the fact that there is about a 96% chance of a negative workup with episodes of microscopic hematuria and with much greater in the face of Gross hematuria.  We discussed the fact that this is a non-cumulative test.  In other words if there is hematuria next year I would recommend continuing to work up the condition because of the fact that neoplasms may be small at the first workup and easily are missed.   We discussed the fact that if there is any history of chronic kidney disease or risk factors such as diabetes for contrast a noncontrasted study will be utilized.  We will initiate an investigation.     IBS- Patient has significant irritable bowel syndrome, characterized by extreme amounts of constipation.  We spoke about the impact of this on bladder function.  We spoke about  its relationship to recurrent urinary tract infections. We discussed the need for increasing p.o. fluid intake to at least 2 to 3 L of water daily, and discussed the physiology of colonic motility as well as use of MiraLAX as a bulk laxative versus the newer class of serotonin uptake blockers such as Linzess.  We stressed the need for a daily bowel movement and discussed the [place] stool scale at length.We also discussed a referral to the GI nurse practitioner                                          PLAN  We will resend her urine for culture, I will call HER with results if any positive bacterial growth.     She will  continue Pyridium 200 mg as needed for bladder pain and discomfort     We discussed Starting antibiotic therapy with Macrobid 100 mg nightly - suppressive therapy ONLY IF POSITIVE CULTURE- Deferred      I recommend concomitant probiotics with treatment with antibiotics to protect the rectal reservoir including over-the-counter yogurt preparations to tami oral pills containing the appropriate probiotics.     Patient has been encouraged to increase p.o. fluid intake to at least 1 to 2 L daily avoiding bladder irritants such as citrusy, spicy and caffeine      We discussed the use of both an upper and lower tract investigation.  I discussed the fact that an upper tract investigation includes a  CT scan with contrast being the gold standard to diagnose the small neoplasms-has been scheduled stat CT, reviewed also negative     We discussed  lower tract investigation consisting of a cystoscopy due to positive history of smoking x20+ years-patient has been scheduled for cystoscopy bladder hydrodistention on procedure in [place] Dr. Bhatt on 05/22/2024-unremarkable     We discussed obtaining a CT urogram for her microhematuria if it persists.     If the patient's microscopic hematuria work-up is negative, per AUA guidelines I would recommend a repeat urinalysis via the patient's primary care provider in 12 months.  If the repeat urinalysis is positive, the patient and I will then need to have a shared decision-making conversation regarding further work-up versus observation, given the low likelihood of identifying etiology in this situation.  If patient were to develop gross hematuria in the interim, the patient would need a total re-evaluation.            She will follow-up in clinic in 3 months for reevaluation,     She may return sooner if need be     The patient is agreeable with plan of care    1. Chronic interstitial cystitis and overactive bladder-bladder spasms-IBS.  She will continue conservative therapy, reports  doing significantly better.    Follow-up  The patient will follow up in 3 months, may return sooner if symptoms worsen.    Patient reports that she is not currently experiencing any symptoms of urinary incontinence.    RADIOLOGY (CT AND/OR KUB):    CT Abdomen and Pelvis: Results for orders placed in visit on 05/10/24    CT Abdomen Pelvis With & Without Contrast    Narrative  EXAM:  CT Abdomen and Pelvis Without and With Intravenous Contrast    EXAM DATE:  5/10/2024 2:09 PM    CLINICAL HISTORY:  GROSS HEMATURIA/ABDOMINAL FLANK PAIN; R31.0-Gross hematuria;  N30.21-Other chronic cystitis with hematuria; R10.30-Lower abdominal  pain, unspecified    TECHNIQUE:  Axial computed tomography images of the abdomen and pelvis without and  with intravenous contrast.  Sagittal and coronal reformatted images were  created and reviewed.  This CT exam was performed using one or more of  the following dose reduction techniques:  automated exposure control,  adjustment of the mA and/or kV according to patient size, and/or use of  iterative reconstruction technique.    COMPARISON:  3/29/2017    FINDINGS:  Lung bases:  Lung bases are clear.  No consolidation.    ABDOMEN:  Liver:  Very mild fatty infiltration of liver.  Gallbladder and bile ducts:  Unremarkable as visualized.  No calcified  stones.  No ductal dilation.  Pancreas:  Unremarkable as visualized.  No mass.  No ductal dilation.  Spleen:  Unremarkable as visualized.  No splenomegaly.  Adrenals:  Unremarkable as visualized.  No mass.  Kidneys and ureters:  Unremarkable as visualized.  No renal or  ureteral stones. No obstructive uropathy.  No solid enhancing renal  masses or renal perfusion defects identified.  Stomach and bowel:  Unremarkable as visualized.  No obstruction.  No  mucosal thickening.    PELVIS:  Appendix:  Normal appendix.  Bladder:  There is mild urinary bladder wall thickening in part  related to incomplete distention but may also reflect changes of  cystitis.   No stones.  Reproductive:  Bilateral tubal ligation clips.  Small left ovarian  cysts, simple in appearance and is 2.6 cm.    ABDOMEN and PELVIS:  Intraperitoneal space:  Unremarkable as visualized.  No significant  fluid collection.  No pneumoperitoneum identified.  Bones/joints:  L5 pars defects with minimal anterior spondylolisthesis  of L5 on S1 and neuroforaminal stenosis at this level.  No acute  fracture.  No dislocation.  Soft tissues:  Unremarkable as visualized.  Vasculature:  Unremarkable as visualized.  No abdominal aortic  aneurysm.  Lymph nodes:  Unremarkable as visualized.  No enlarged lymph nodes.    Impression  1.  No renal or ureteral stones. No obstructive uropathy.  2.  No solid enhancing renal masses or renal perfusion defects  identified.  3.  There is mild urinary bladder wall thickening in part related to  incomplete distention but may also reflect changes of cystitis.  4.  L5 pars defects with minimal anterior spondylolisthesis of L5 on S1  and neuroforaminal stenosis at this level.  5. Other incidental/nonacute findings above.      This report was finalized on 5/10/2024 2:34 PM by Dr. Renzo Khan MD.     CT Stone Protocol: No results found for this or any previous visit.     KUB: No results found for this or any previous visit.       [unfilled]  LABS (3 MONTHS):    Office Visit on 08/07/2024   Component Date Value Ref Range Status    Color 08/07/2024 Yellow  Yellow, Straw, Dark Yellow, Pam Final    Clarity, UA 08/07/2024 Clear  Clear Final    Specific Gravity  08/07/2024 1.010  1.005 - 1.030 Final    pH, Urine 08/07/2024 5.5  5.0 - 8.0 Final    Leukocytes 08/07/2024 Negative  Negative Final    Nitrite, UA 08/07/2024 Negative  Negative Final    Protein, POC 08/07/2024 Negative  Negative mg/dL Final    Glucose, UA 08/07/2024 Negative  Negative mg/dL Final    Ketones, UA 08/07/2024 Negative  Negative Final    Urobilinogen, UA 08/07/2024 Normal  Normal, 0.2 E.U./dL Final    Bilirubin  08/07/2024 Negative  Negative Final    Blood, UA 08/07/2024 Negative  Negative Final    Lot Number 08/07/2024 n   Final    Expiration Date 08/07/2024 n   Final   Office Visit on 07/24/2024   Component Date Value Ref Range Status    Color 07/24/2024 Yellow  Yellow, Straw, Dark Yellow, Pam Final    Clarity, UA 07/24/2024 Clear  Clear Final    Specific Gravity  07/24/2024 1.000 (A)  1.005 - 1.030 Final    pH, Urine 07/24/2024 6.0  5.0 - 8.0 Final    Leukocytes 07/24/2024 Small (1+) (A)  Negative Final    Nitrite, UA 07/24/2024 Negative  Negative Final    Protein, POC 07/24/2024 Negative  Negative mg/dL Final    Glucose, UA 07/24/2024 Negative  Negative mg/dL Final    Ketones, UA 07/24/2024 Negative  Negative Final    Urobilinogen, UA 07/24/2024 Normal  Normal, 0.2 E.U./dL Final    Bilirubin 07/24/2024 Negative  Negative Final    Blood, UA 07/24/2024 Negative  Negative Final    Lot Number 07/24/2024 [id number]   Final    Expiration Date 07/24/2024 10/25/2024   Final    Urine Culture 07/24/2024 <25,000 CFU/mL Gram Negative Bacilli (A)   Final   Admission on 05/22/2024, Discharged on 05/22/2024   Component Date Value Ref Range Status    HCG, Urine, QL 05/22/2024 Negative  Negative Final    Lot Number 05/22/2024 [id number]   Final    Internal Positive Control 05/22/2024 Positive  Positive, Passed Final    Internal Negative Control 05/22/2024 Negative  Negative, Passed Final    Expiration Date 05/22/2024 2025-04-11   Final   Pre-Admission Testing on 05/20/2024   Component Date Value Ref Range Status    Glucose 05/20/2024 99  65 - 99 mg/dL Final    BUN 05/20/2024 8  6 - 20 mg/dL Final    Creatinine 05/20/2024 0.83  0.57 - 1.00 mg/dL Final    Sodium 05/20/2024 138  136 - 145 mmol/L Final    Potassium 05/20/2024 3.7  3.5 - 5.2 mmol/L Final    Chloride 05/20/2024 105  98 - 107 mmol/L Final    CO2 05/20/2024 22.3  22.0 - 29.0 mmol/L Final    Calcium 05/20/2024 10.2  8.6 - 10.5 mg/dL Final    BUN/Creatinine Ratio 05/20/2024  9.6  7.0 - 25.0 Final    Anion Gap 05/20/2024 10.7  5.0 - 15.0 mmol/L Final    eGFR 05/20/2024 94.4  >60.0 mL/min/1.73 Final    WBC 05/20/2024 9.16  3.40 - 10.80 10*3/mm3 Final    RBC 05/20/2024 4.97  3.77 - 5.28 10*6/mm3 Final    Hemoglobin 05/20/2024 13.4  12.0 - 15.9 g/dL Final    Hematocrit 05/20/2024 43.5  34.0 - 46.6 % Final    MCV 05/20/2024 87.5  79.0 - 97.0 fL Final    MCH 05/20/2024 27.0  26.6 - 33.0 pg Final    MCHC 05/20/2024 30.8 (L)  31.5 - 35.7 g/dL Final    RDW 05/20/2024 14.1  12.3 - 15.4 % Final    RDW-SD 05/20/2024 44.5  37.0 - 54.0 fl Final    MPV 05/20/2024 8.7  6.0 - 12.0 fL Final    Platelets 05/20/2024 408  140 - 450 10*3/mm3 Final   Office Visit on 05/10/2024   Component Date Value Ref Range Status    Urine Culture 05/10/2024 >100,000 CFU/mL Streptococcus agalactiae (Group B) (A)   Final      This organism is considered to be universally susceptible to penicillin.  No further antibiotic testing will be performed.        Follow Up   Return in about 3 months (around 11/8/2024) for Next scheduled follow up, RECURRENT UTI/DYSURIA/DETRUSSOR INSTABILITY/BLADDER SPASMS/HEMATURIA.    Patient was given instructions and counseling regarding her condition or for health maintenance advice. Please see specific information pulled into the AVS if appropriate.          This document has been electronically signed by Griselda Cheng-Akwa, APRN   August 7, 2024 15:42 EDT      Dictated Utilizing Dragon Dictation: Part of this note may be an electronic transcription/translation of spoken language to printed text using the Dragon Dictation System.      Patient or patient representative verbalized consent for the use of Ambient Listening during the visit with  Griselda Cheng-Akwa, APRN for chart documentation. 8/7/2024  15:34 EDT

## 2024-08-09 ENCOUNTER — TELEPHONE (OUTPATIENT)
Dept: UROLOGY | Facility: CLINIC | Age: 34
End: 2024-08-09
Payer: MEDICAID

## 2024-08-09 LAB — BACTERIA SPEC AEROBE CULT: NO GROWTH

## 2024-08-09 NOTE — TELEPHONE ENCOUNTER
I called the pt letting her know that her urine culture was negative for any bacterial infection. The pt verbalized understanding.                                   ----- Message from Griselda Cheng-Akwa sent at 8/8/2024  4:09 PM EDT -----  KINDLY LET PATIENT KNOW HER URINE CULTURE IS NEGATIVE FOR ANY INFECTION AT THIS TIME     Urine Culture - No growth     HOWEVER, she may drop off another urine dip if she feels symptomatic.     CONTINUE ANTIBIOTIC SUPPRESSIVE THERAPY ONE TIME DAILY OR NIGHTLY-ONLY IF INDICATED     Increase p.o. fluid intake to at least 1 to 2 L daily,     Follow-up in clinic as discussed.     THANK YOU

## 2024-09-19 DIAGNOSIS — N32.81 DETRUSOR INSTABILITY OF BLADDER: ICD-10-CM

## 2024-09-19 DIAGNOSIS — N30.10 BLADDER PAIN SYNDROME: ICD-10-CM

## 2024-09-19 DIAGNOSIS — N30.21 CHRONIC CYSTITIS WITH HEMATURIA: ICD-10-CM

## 2024-09-19 RX ORDER — AMITRIPTYLINE HYDROCHLORIDE 10 MG/1
10 TABLET ORAL DAILY
Qty: 30 TABLET | Refills: 1 | Status: SHIPPED | OUTPATIENT
Start: 2024-09-19

## 2024-10-14 ENCOUNTER — OFFICE VISIT (OUTPATIENT)
Dept: UROLOGY | Facility: CLINIC | Age: 34
End: 2024-10-14
Payer: MEDICAID

## 2024-10-14 VITALS
DIASTOLIC BLOOD PRESSURE: 83 MMHG | SYSTOLIC BLOOD PRESSURE: 124 MMHG | HEIGHT: 66 IN | WEIGHT: 217 LBS | HEART RATE: 85 BPM | BODY MASS INDEX: 34.87 KG/M2

## 2024-10-14 DIAGNOSIS — R35.0 FREQUENCY OF MICTURITION: ICD-10-CM

## 2024-10-14 DIAGNOSIS — N30.10 BLADDER PAIN SYNDROME: ICD-10-CM

## 2024-10-14 DIAGNOSIS — N94.10 DYSPAREUNIA IN FEMALE: ICD-10-CM

## 2024-10-14 DIAGNOSIS — N30.21 CHRONIC CYSTITIS WITH HEMATURIA: Primary | ICD-10-CM

## 2024-10-14 LAB
BILIRUB BLD-MCNC: NEGATIVE MG/DL
CLARITY, POC: CLEAR
COLOR UR: YELLOW
EXPIRATION DATE: ABNORMAL
GLUCOSE UR STRIP-MCNC: NEGATIVE MG/DL
KETONES UR QL: NEGATIVE
LEUKOCYTE EST, POC: NEGATIVE
Lab: ABNORMAL
NITRITE UR-MCNC: NEGATIVE MG/ML
PH UR: 6 [PH] (ref 5–8)
PROT UR STRIP-MCNC: NEGATIVE MG/DL
RBC # UR STRIP: NEGATIVE /UL
SP GR UR: 1 (ref 1–1.03)
UROBILINOGEN UR QL: NORMAL

## 2024-10-14 PROCEDURE — 87086 URINE CULTURE/COLONY COUNT: CPT | Performed by: NURSE PRACTITIONER

## 2024-10-14 PROCEDURE — 99214 OFFICE O/P EST MOD 30 MIN: CPT | Performed by: NURSE PRACTITIONER

## 2024-10-14 PROCEDURE — 51798 US URINE CAPACITY MEASURE: CPT | Performed by: NURSE PRACTITIONER

## 2024-10-14 RX ORDER — TAMSULOSIN HYDROCHLORIDE 0.4 MG/1
1 CAPSULE ORAL DAILY
Qty: 30 CAPSULE | Refills: 5 | Status: SHIPPED | OUTPATIENT
Start: 2024-10-14

## 2024-10-14 RX ORDER — PROMETHAZINE HYDROCHLORIDE 25 MG/1
25 TABLET ORAL EVERY 12 HOURS PRN
Qty: 20 TABLET | Refills: 0 | Status: SHIPPED | OUTPATIENT
Start: 2024-10-14

## 2024-10-14 RX ORDER — KETOROLAC TROMETHAMINE 10 MG/1
10 TABLET, FILM COATED ORAL EVERY 6 HOURS PRN
Qty: 20 TABLET | Refills: 0 | Status: SHIPPED | OUTPATIENT
Start: 2024-10-14 | End: 2024-10-19

## 2024-10-14 NOTE — PROGRESS NOTES
Chief Complaint  Chronic cystitis with hematuria (2 MONTH FOLLOW UP FOR IC/O    Subjective          Dawna Stevens presents to [facility] for  FOR IC/OAB/DI WITH BLADDER SPASMS-IMPROVED  History of Present Illness   History of Present Illness  The patient is a pleasant 34-year-old female who returns to clinic today for evaluation. This is AN 8 -week follow-up with prior concerns of chronic interstitial cystitis and overactive bladder/detrusor instability, complicated by bouts of urine frequency, urgency, constant burning with urination bouts of gross hematuria.     She is in no apparent discomfort today 10/14/24 and reports significant improvement in her prior symptoms.  She denies any recent burning with urination, denies frequency, urgency, pelvic pain or suprapubic discomfort.  She denies flank pain, she does not have any hematuria episodes.  She however REPORTS dyspareunia which has been persistent 3 days post intercourse and is becoming very bothersome to her.  Patient describes her vaginal pain is gruesome, now causing significant pelvic pressure and suprapubic discomfort.  She has not had a recent UTI in quite a while, her urinalysis in clinic today is completely negative for leukocyte esterase, it is negative for nitrites, it is negative for gross/microscopic hematuria.  Her PVR however is greater than 815 mL.      Last evaluated in clinic on 08/07/2024, the patient was in NO apparent discomfort and reported remarkable improvement.  Back on 07/2024 she HAD reported persistent bladder pain, pressure, and bladder spasms with worsening hematuria-also resolved.  She was post cystoscopy, bladder hydrodistention procedure and urethral dilatation completed by Dr. Niño on 05/22/2024. This was secondary to her bladder pain that has been recalcitrant to oral medications.  Patient reports significant improvement in her overall symptoms after this procedure and would like to  repeat.    She did relatively well post procedure, but had started having dysuria with chills concerning for UTIs. Urinalysis in clinic/urine culture was completely negative for any bacterial growth. She had been restarted on Macrobid prophylaxis for chronic interstitial cystitis, oxybutynin for overactive bladder/detrusor instability, and Pyridium for bladder pain and spasms.she is also currently taking Elavil nightly for bladder pain and she returns today for follow-up, feeling better. The patient reports doing significantly better.until recently she has been miserable.      OVERALL, She reports an improvement in her condition. Her bowel movements have improved after taking MiraLAX for 3 days and Linzess 245 mcg for 2 days. She denies any blood in her urine.    IMPRESSION:05/10/2024  1.  No renal or ureteral stones. No obstructive uropathy.  2.  No solid enhancing renal masses or renal perfusion defects identified.  3.  There is mild urinary bladder wall thickening in part related to incomplete distention but may also reflect changes of cystitis.  4.  L5 pars defects with minimal anterior spondylolisthesis of L5 on S1 and neuro foraminal stenosis at this level.  5. Other incidental/nonacute findings above.    Active Ambulatory Problems     Diagnosis Date Noted    Lower abdominal pain 02/16/2017    Blood in feces 02/16/2017    Irritable bowel syndrome with both constipation and diarrhea 02/16/2017    Abdominal pain, epigastric 02/16/2017    Gross hematuria 05/10/2024    Chronic cystitis with hematuria 05/10/2024    Frequency of micturition 05/10/2024    Incomplete emptying of bladder 05/10/2024    Bladder pain syndrome 05/10/2024    Detrusor instability of bladder 05/10/2024    Dyspareunia in female 10/14/2024     Resolved Ambulatory Problems     Diagnosis Date Noted    No Resolved Ambulatory Problems     Past Medical History:   Diagnosis Date    Abnormal Pap smear of cervix     Anxiety and depression     Bipolar 1  "disorder     GERD (gastroesophageal reflux disease)     History of transfusion     Interstitial cystitis     Kidney stone     PCOS (polycystic ovarian syndrome)     PTSD (post-traumatic stress disorder)     Pyelonephritis     Urinary incontinence     Urinary tract infection       Objective   Vital Signs:   /83 (BP Location: Right arm, Patient Position: Sitting)   Pulse 85   Ht 167.6 cm (65.98\")   Wt 98.4 kg (217 lb)   BMI 35.04 kg/m²       ROS:   Review of Systems   Constitutional:  Positive for activity change, appetite change, fatigue and unexpected weight gain. Negative for chills, diaphoresis, fever and unexpected weight loss.   HENT:  Negative for congestion, ear discharge, ear pain, nosebleeds, rhinorrhea, sinus pressure and sore throat.    Eyes:  Negative for blurred vision, double vision, photophobia, pain, redness and visual disturbance.   Respiratory:  Negative for apnea, cough, chest tightness, shortness of breath, wheezing and stridor.    Cardiovascular:  Negative for chest pain and palpitations.   Gastrointestinal:  Positive for abdominal distention, abdominal pain and constipation. Negative for diarrhea, nausea and vomiting.   Endocrine: Negative for polydipsia, polyphagia and polyuria.   Genitourinary:  Positive for dysuria, frequency, hematuria, pelvic pain, pelvic pressure and urgency. Negative for decreased urine volume, difficulty urinating, dyspareunia, flank pain, genital sores, urinary incontinence and vaginal discharge.   Musculoskeletal:  Positive for back pain and myalgias. Negative for arthralgias and joint swelling.   Skin:  Positive for dry skin and pallor. Negative for rash and wound.   Allergic/Immunologic: Negative for food allergies.   Neurological:  Positive for headache. Negative for dizziness, tremors, syncope, weakness, light-headedness, memory problem and confusion.   Hematological:  Does not bruise/bleed easily.   Psychiatric/Behavioral:  Positive for sleep disturbance " and stress. Negative for behavioral problems and decreased concentration. The patient is nervous/anxious.         Physical Exam  Constitutional:       General: She is not in acute distress.     Appearance: She is well-developed. She is obese.   HENT:      Head: Normocephalic and atraumatic.   Eyes:      Pupils: Pupils are equal, round, and reactive to light.   Neck:      Thyroid: No thyromegaly.      Trachea: No tracheal deviation.   Cardiovascular:      Rate and Rhythm: Normal rate and regular rhythm.      Heart sounds: No murmur heard.  Pulmonary:      Effort: Pulmonary effort is normal. No respiratory distress.      Breath sounds: Normal breath sounds. No stridor. No wheezing.   Abdominal:      General: Bowel sounds are normal. There is distension.      Palpations: Abdomen is soft.      Tenderness: There is abdominal tenderness.   Genitourinary:     Labia:         Right: No tenderness.         Left: No tenderness.       Vagina: Normal. No vaginal discharge.   Musculoskeletal:         General: Tenderness present. No deformity. Normal range of motion.      Cervical back: Normal range of motion.   Skin:     General: Skin is warm and dry.      Capillary Refill: Capillary refill takes less than 2 seconds.      Coloration: Skin is not pale.      Findings: No erythema or rash.   Neurological:      Mental Status: She is alert and oriented to person, place, and time.      Cranial Nerves: No cranial nerve deficit.      Sensory: No sensory deficit.      Motor: Weakness present.      Coordination: Coordination normal.   Psychiatric:         Behavior: Behavior normal.         Thought Content: Thought content normal.         Judgment: Judgment normal.        Physical Exam    Result Review :     UA          7/24/2024    13:21 8/7/2024    15:06 10/14/2024    14:15   Urinalysis   Ketones, UA Negative  Negative  Negative    Leukocytes, UA Small (1+)  Negative  Negative      Urine Culture          5/10/2024    13:19 7/24/2024     13:18 8/7/2024    15:02   Urine Culture   Urine Culture >100,000 CFU/mL Streptococcus agalactiae (Group B)  <25,000 CFU/mL Gram Negative Bacilli  No growth           Assessment and Plan    Problem List Items Addressed This Visit          Genitourinary and Reproductive     Chronic cystitis with hematuria - Primary    Relevant Medications    tamsulosin (FLOMAX) 0.4 MG capsule 24 hr capsule    ketorolac (TORADOL) 10 MG tablet    promethazine (PHENERGAN) 25 MG tablet    Other Relevant Orders    POC Urinalysis Dipstick, Automated (Completed)    Urine Culture - Urine, Urine, Clean Catch    Case Request (Completed)    Frequency of micturition    Relevant Medications    tamsulosin (FLOMAX) 0.4 MG capsule 24 hr capsule    promethazine (PHENERGAN) 25 MG tablet    Other Relevant Orders    Bladder Scan (Completed)    Case Request (Completed)    Dyspareunia in female    Relevant Medications    ketorolac (TORADOL) 10 MG tablet    promethazine (PHENERGAN) 25 MG tablet    Other Relevant Orders    Case Request (Completed)       Other    Bladder pain syndrome    Relevant Medications    tamsulosin (FLOMAX) 0.4 MG capsule 24 hr capsule    ketorolac (TORADOL) 10 MG tablet    promethazine (PHENERGAN) 25 MG tablet    Other Relevant Orders    Case Request (Completed)         Assessment & Plan         Assessment    CHRONIC IC VS HEMORRHAGIC CYSTITIS/BLADDER /PELVIC PAIN/HEMATURIA/DYSPAREUNIA  MS KAROLYN KAUFFMAN  is a pleasant 34-year-old female who returns to clinic today for evaluation.  This is a 9 weeks follow-up with prior complaints of bladder pain and discomfort, acute cystitis with hematuria, urinary symptoms of frequency urgency and dysuria, gross Hematuria.  When last evaluated on 8/7/2024 patient was in no apparent discomfort and reported a significant improvement in her prior symptoms.  She has done relatively well until recently she has another flareup post intercourse.  Patient reports persistent bladder pain and discomfort which  gets her doubled over and sleep is hard to come by.  She would like to proceed with another bladder hydrodistention.     She has a history of chronic interstitial cystitis, post bladder Botox, PCOS, PTSD, bipolar one disorder, and recurrent UTIs.  However her last urinalysis was negative for bacterial growth.  We did recheck her urine for infection secondary to patient reports of recurrent episodes of hematuria, characterized by clear and prolonged urine clots, occasionally presenting as bloody spots.  Her urine culture was also negative.  Her urinalysis today is also negative for leukocyte esterase, it is negative for nitrites, it is negative for gross/microscopic hematuria.  Her PVR is 0.     Interstitial cystitis-Again, we discussed the diagnosis and management of this condition.  I indicated that it was a multifactorial condition with multifactorial symptomatology, Including frequency, urgency, the sensation of urinary tract infection in the absence of a positive culture, sexual symptomatology including significant dyspareunia.       We discussed treatment options including the importance of making a clinical diagnosis and the cystoscopic findings including Hunner's ulcers etc.  We also discussed medical management including pharmacologic treatment such as amitriptyline, naturopathic treatments such as pumpkin oil and which more aggressive options of Botox injections as well as the relative risks and merits of this.  We also discussed the use of dietary manipulation including the over-the-counter product relief which basically decreases the acid in the urine and avoidance of ascitic-containing foods such as citrus is etc.Sjogren's syndrome     Discussed Microscopic Hematuria with patient. She has been VERY Symptomatic for gross hematuria. However, pt educated on possible causes such as infection in the bladder, kidney, or bladder discomfort, trauma. vigorous exercise, different kinds of cancers, viral illness,  such as hepatitis a virus that causes liver disease and inflammation of the liver and sexual activity.  WE Discussed the fact that there is about a 96% chance of a negative workup with episodes of microscopic hematuria and with much greater in the face of Gross hematuria.  We discussed the fact that this is a non-cumulative test.  In other words if there is hematuria next year I would recommend continuing to work up the condition because of the fact that neoplasms may be small at the first workup and easily are missed.   We discussed the fact that if there is any history of chronic kidney disease or risk factors such as diabetes for contrast a noncontrasted study will be utilized.  We will initiate an investigation.     IBS- Patient has significant irritable bowel syndrome, characterized by extreme amounts of constipation.  We spoke about the impact of this on bladder function.  We spoke about  its relationship to recurrent urinary tract infections. We discussed the need for increasing p.o. fluid intake to at least 2 to 3 L of water daily, and discussed the physiology of colonic motility as well as use of MiraLAX as a bulk laxative versus the newer class of serotonin uptake blockers such as Linzess.  We stressed the need for a daily bowel movement and discussed the [place] stool scale at length.We also discussed a referral to the GI nurse practitioner                                          PLAN  Patient has been rescheduled for cystoscopy bladder hydrodistention procedure on 11/06/2024 Dr. Bhatt-CHRONIC IC    We will resend her urine for culture, I will call HER with results if any positive bacterial growth.     She will continue Pyridium 200 mg as needed for bladder pain and discomfort     We discussed Starting antibiotic therapy with Macrobid 100 mg nightly - suppressive therapy ONLY IF POSITIVE CULTURE- Deferred      I recommend concomitant probiotics with treatment with antibiotics to protect the rectal  reservoir including over-the-counter yogurt preparations to tami oral pills containing the appropriate probiotics.     Patient has been encouraged to increase p.o. fluid intake to at least 1 to 2 L daily avoiding bladder irritants such as citrusy, spicy and caffeine      We discussed the use of both an upper and lower tract investigation.  I discussed the fact that an upper tract investigation includes a  CT scan with contrast being the gold standard to diagnose the small neoplasms-has been scheduled stat CT, reviewed also negative     We discussed  lower tract investigation consisting of a cystoscopy due to positive history of smoking x20+ years-patient has been scheduled for cystoscopy bladder hydrodistention on procedure [place] Dr. Bhatt on 05/22/2024-unremarkable    We discussed OTHER  treatment options for HER bladder pain /dyspareunia with patient encouraged to continue conservative therapy alternating NSAID/Tylenol as tolerated, Also including hot baths, showers, warm compresses to lower back as tolerated. Also encouraged walking, physical therapy, light exercises as tolerated    Also encouraged lubrication during intercourse, continue Pyridium as needed, Elavil nightly for bladder pain and discomfort    Rest is the most important treatment in the case of bladder pain and post intercourse vaginal pain.  Did explain to patient this is a trigger for cystitis.  Rest and physical therapy are enough to improve minor pain. Discussed to monitor his daily routine with prevention of flank pain by: At least Drinking 8 glasses of water per day, Limiting your alcohol consumption.  Having a healthy diet containing fruits, vegetables, and a lot of fluids, Practicing safe sex.  Also maintaining proper hygiene of your body as well as the environment.    FOLLOW UP WITH GI-ABDOMINAL PAIN, IBS-C    Encouraged she continue her daily stool softeners    We discussed obtaining a CT urogram for her microhematuria if it persists.      If the patient's microscopic hematuria work-up is negative, per AUA guidelines I would recommend a repeat urinalysis via the patient's primary care provider in 12 months.  If the repeat urinalysis is positive, the patient and I will then need to have a shared decision-making conversation regarding further work-up versus observation, given the low likelihood of identifying etiology in this situation.  If patient were to develop gross hematuria in the interim, the patient would need a total re-evaluation.            She will follow-up in clinic postprocedure for reevaluation,     She may return sooner if need be     The patient is Agreeable with plan of care    1. Chronic interstitial cystitis and overactive bladder-bladder spasms-IBS.  She will continue conservative therapy, reports doing significantly better.    Patient reports that she is not currently experiencing any symptoms of urinary incontinence.    RADIOLOGY (CT AND/OR KUB):    CT Abdomen and Pelvis: Results for orders placed in visit on 05/10/24    CT Abdomen Pelvis With & Without Contrast    Narrative  EXAM:  CT Abdomen and Pelvis Without and With Intravenous Contrast    EXAM DATE:  5/10/2024 2:09 PM    CLINICAL HISTORY:  GROSS HEMATURIA/ABDOMINAL FLANK PAIN; R31.0-Gross hematuria;  N30.21-Other chronic cystitis with hematuria; R10.30-Lower abdominal  pain, unspecified    TECHNIQUE:  Axial computed tomography images of the abdomen and pelvis without and  with intravenous contrast.  Sagittal and coronal reformatted images were  created and reviewed.  This CT exam was performed using one or more of  the following dose reduction techniques:  automated exposure control,  adjustment of the mA and/or kV according to patient size, and/or use of  iterative reconstruction technique.    COMPARISON:  3/29/2017    FINDINGS:  Lung bases:  Lung bases are clear.  No consolidation.    ABDOMEN:  Liver:  Very mild fatty infiltration of liver.  Gallbladder and bile ducts:   Unremarkable as visualized.  No calcified  stones.  No ductal dilation.  Pancreas:  Unremarkable as visualized.  No mass.  No ductal dilation.  Spleen:  Unremarkable as visualized.  No splenomegaly.  Adrenals:  Unremarkable as visualized.  No mass.  Kidneys and ureters:  Unremarkable as visualized.  No renal or  ureteral stones. No obstructive uropathy.  No solid enhancing renal  masses or renal perfusion defects identified.  Stomach and bowel:  Unremarkable as visualized.  No obstruction.  No  mucosal thickening.    PELVIS:  Appendix:  Normal appendix.  Bladder:  There is mild urinary bladder wall thickening in part  related to incomplete distention but may also reflect changes of  cystitis.  No stones.  Reproductive:  Bilateral tubal ligation clips.  Small left ovarian  cysts, simple in appearance and is 2.6 cm.    ABDOMEN and PELVIS:  Intraperitoneal space:  Unremarkable as visualized.  No significant  fluid collection.  No pneumoperitoneum identified.  Bones/joints:  L5 pars defects with minimal anterior spondylolisthesis  of L5 on S1 and neuroforaminal stenosis at this level.  No acute  fracture.  No dislocation.  Soft tissues:  Unremarkable as visualized.  Vasculature:  Unremarkable as visualized.  No abdominal aortic  aneurysm.  Lymph nodes:  Unremarkable as visualized.  No enlarged lymph nodes.    Impression  1.  No renal or ureteral stones. No obstructive uropathy.  2.  No solid enhancing renal masses or renal perfusion defects  identified.  3.  There is mild urinary bladder wall thickening in part related to  incomplete distention but may also reflect changes of cystitis.  4.  L5 pars defects with minimal anterior spondylolisthesis of L5 on S1  and neuroforaminal stenosis at this level.  5. Other incidental/nonacute findings above.      This report was finalized on 5/10/2024 2:34 PM by Dr. Renzo Khan MD.     CT Stone Protocol: No results found for this or any previous visit.     KUB: No results found  for this or any previous visit.       [unfilled]  LABS (3 MONTHS):    Office Visit on 10/14/2024   Component Date Value Ref Range Status    Color 10/14/2024 Yellow  Yellow, Straw, Dark Yellow, Pam Final    Clarity, UA 10/14/2024 Clear  Clear Final    Specific Gravity  10/14/2024 1.000 (A)  1.005 - 1.030 Final    pH, Urine 10/14/2024 6.0  5.0 - 8.0 Final    Leukocytes 10/14/2024 Negative  Negative Final    Nitrite, UA 10/14/2024 Negative  Negative Final    Protein, POC 10/14/2024 Negative  Negative mg/dL Final    Glucose, UA 10/14/2024 Negative  Negative mg/dL Final    Ketones, UA 10/14/2024 Negative  Negative Final    Urobilinogen, UA 10/14/2024 Normal  Normal, 0.2 E.U./dL Final    Bilirubin 10/14/2024 Negative  Negative Final    Blood, UA 10/14/2024 Negative  Negative Final    Lot Number 10/14/2024 [id number]   Final    Expiration Date 10/14/2024 102,024   Final   Office Visit on 08/07/2024   Component Date Value Ref Range Status    Color 08/07/2024 Yellow  Yellow, Straw, Dark Yellow, Pam Final    Clarity, UA 08/07/2024 Clear  Clear Final    Specific Gravity  08/07/2024 1.010  1.005 - 1.030 Final    pH, Urine 08/07/2024 5.5  5.0 - 8.0 Final    Leukocytes 08/07/2024 Negative  Negative Final    Nitrite, UA 08/07/2024 Negative  Negative Final    Protein, POC 08/07/2024 Negative  Negative mg/dL Final    Glucose, UA 08/07/2024 Negative  Negative mg/dL Final    Ketones, UA 08/07/2024 Negative  Negative Final    Urobilinogen, UA 08/07/2024 Normal  Normal, 0.2 E.U./dL Final    Bilirubin 08/07/2024 Negative  Negative Final    Blood, UA 08/07/2024 Negative  Negative Final    Lot Number 08/07/2024 n   Final    Expiration Date 08/07/2024 n   Final    Urine Culture 08/07/2024 No growth   Final   Office Visit on 07/24/2024   Component Date Value Ref Range Status    Color 07/24/2024 Yellow  Yellow, Straw, Dark Yellow, Pam Final    Clarity, UA 07/24/2024 Clear  Clear Final    Specific Gravity  07/24/2024 1.000 (A)   1.005 - 1.030 Final    pH, Urine 07/24/2024 6.0  5.0 - 8.0 Final    Leukocytes 07/24/2024 Small (1+) (A)  Negative Final    Nitrite, UA 07/24/2024 Negative  Negative Final    Protein, POC 07/24/2024 Negative  Negative mg/dL Final    Glucose, UA 07/24/2024 Negative  Negative mg/dL Final    Ketones, UA 07/24/2024 Negative  Negative Final    Urobilinogen, UA 07/24/2024 Normal  Normal, 0.2 E.U./dL Final    Bilirubin 07/24/2024 Negative  Negative Final    Blood, UA 07/24/2024 Negative  Negative Final    Lot Number 07/24/2024 [id number]   Final    Expiration Date 07/24/2024 10/25/2024   Final    Urine Culture 07/24/2024 <25,000 CFU/mL Gram Negative Bacilli (A)   Final        Follow Up   Return in about 23 days (around 11/6/2024) for Next scheduled follow up, With Dr. Bhatt, Cystoscopy IN [place] BLADDER HYDRODISTENTION - CHRONIC IC.    Patient was given instructions and counseling regarding her condition or for health maintenance advice. Please see specific information pulled into the AVS if appropriate.          This document has been electronically signed by Griselda Cheng-Akwa, APRN   October 14, 2024 20:41 EDT      Dictated Utilizing Dragon Dictation: Part of this note may be an electronic transcription/translation of spoken language to printed text using the Dragon Dictation System.      Patient or patient representative verbalized consent for the use of Ambient Listening during the visit with  Griselda Cheng-Akwa, APRN for chart documentation. 10/14/2024  15:34 EDT

## 2024-10-14 NOTE — H&P (VIEW-ONLY)
Chief Complaint  Chronic cystitis with hematuria (2 MONTH FOLLOW UP FOR IC/O    Subjective          Dawna Stevens presents to [facility] for  FOR IC/OAB/DI WITH BLADDER SPASMS-IMPROVED  History of Present Illness   History of Present Illness  The patient is a pleasant 34-year-old female who returns to clinic today for evaluation. This is AN 8 -week follow-up with prior concerns of chronic interstitial cystitis and overactive bladder/detrusor instability, complicated by bouts of urine frequency, urgency, constant burning with urination bouts of gross hematuria.     She is in no apparent discomfort today 10/14/24 and reports significant improvement in her prior symptoms.  She denies any recent burning with urination, denies frequency, urgency, pelvic pain or suprapubic discomfort.  She denies flank pain, she does not have any hematuria episodes.  She however REPORTS dyspareunia which has been persistent 3 days post intercourse and is becoming very bothersome to her.  Patient describes her vaginal pain is gruesome, now causing significant pelvic pressure and suprapubic discomfort.  She has not had a recent UTI in quite a while, her urinalysis in clinic today is completely negative for leukocyte esterase, it is negative for nitrites, it is negative for gross/microscopic hematuria.  Her PVR however is greater than 815 mL.      Last evaluated in clinic on 08/07/2024, the patient was in NO apparent discomfort and reported remarkable improvement.  Back on 07/2024 she HAD reported persistent bladder pain, pressure, and bladder spasms with worsening hematuria-also resolved.  She was post cystoscopy, bladder hydrodistention procedure and urethral dilatation completed by Dr. Niño on 05/22/2024. This was secondary to her bladder pain that has been recalcitrant to oral medications.  Patient reports significant improvement in her overall symptoms after this procedure and would like to  repeat.    She did relatively well post procedure, but had started having dysuria with chills concerning for UTIs. Urinalysis in clinic/urine culture was completely negative for any bacterial growth. She had been restarted on Macrobid prophylaxis for chronic interstitial cystitis, oxybutynin for overactive bladder/detrusor instability, and Pyridium for bladder pain and spasms.she is also currently taking Elavil nightly for bladder pain and she returns today for follow-up, feeling better. The patient reports doing significantly better.until recently she has been miserable.      OVERALL, She reports an improvement in her condition. Her bowel movements have improved after taking MiraLAX for 3 days and Linzess 245 mcg for 2 days. She denies any blood in her urine.    IMPRESSION:05/10/2024  1.  No renal or ureteral stones. No obstructive uropathy.  2.  No solid enhancing renal masses or renal perfusion defects identified.  3.  There is mild urinary bladder wall thickening in part related to incomplete distention but may also reflect changes of cystitis.  4.  L5 pars defects with minimal anterior spondylolisthesis of L5 on S1 and neuro foraminal stenosis at this level.  5. Other incidental/nonacute findings above.    Active Ambulatory Problems     Diagnosis Date Noted    Lower abdominal pain 02/16/2017    Blood in feces 02/16/2017    Irritable bowel syndrome with both constipation and diarrhea 02/16/2017    Abdominal pain, epigastric 02/16/2017    Gross hematuria 05/10/2024    Chronic cystitis with hematuria 05/10/2024    Frequency of micturition 05/10/2024    Incomplete emptying of bladder 05/10/2024    Bladder pain syndrome 05/10/2024    Detrusor instability of bladder 05/10/2024    Dyspareunia in female 10/14/2024     Resolved Ambulatory Problems     Diagnosis Date Noted    No Resolved Ambulatory Problems     Past Medical History:   Diagnosis Date    Abnormal Pap smear of cervix     Anxiety and depression     Bipolar 1  "disorder     GERD (gastroesophageal reflux disease)     History of transfusion     Interstitial cystitis     Kidney stone     PCOS (polycystic ovarian syndrome)     PTSD (post-traumatic stress disorder)     Pyelonephritis     Urinary incontinence     Urinary tract infection       Objective   Vital Signs:   /83 (BP Location: Right arm, Patient Position: Sitting)   Pulse 85   Ht 167.6 cm (65.98\")   Wt 98.4 kg (217 lb)   BMI 35.04 kg/m²       ROS:   Review of Systems   Constitutional:  Positive for activity change, appetite change, fatigue and unexpected weight gain. Negative for chills, diaphoresis, fever and unexpected weight loss.   HENT:  Negative for congestion, ear discharge, ear pain, nosebleeds, rhinorrhea, sinus pressure and sore throat.    Eyes:  Negative for blurred vision, double vision, photophobia, pain, redness and visual disturbance.   Respiratory:  Negative for apnea, cough, chest tightness, shortness of breath, wheezing and stridor.    Cardiovascular:  Negative for chest pain and palpitations.   Gastrointestinal:  Positive for abdominal distention, abdominal pain and constipation. Negative for diarrhea, nausea and vomiting.   Endocrine: Negative for polydipsia, polyphagia and polyuria.   Genitourinary:  Positive for dysuria, frequency, hematuria, pelvic pain, pelvic pressure and urgency. Negative for decreased urine volume, difficulty urinating, dyspareunia, flank pain, genital sores, urinary incontinence and vaginal discharge.   Musculoskeletal:  Positive for back pain and myalgias. Negative for arthralgias and joint swelling.   Skin:  Positive for dry skin and pallor. Negative for rash and wound.   Allergic/Immunologic: Negative for food allergies.   Neurological:  Positive for headache. Negative for dizziness, tremors, syncope, weakness, light-headedness, memory problem and confusion.   Hematological:  Does not bruise/bleed easily.   Psychiatric/Behavioral:  Positive for sleep disturbance " and stress. Negative for behavioral problems and decreased concentration. The patient is nervous/anxious.         Physical Exam  Constitutional:       General: She is not in acute distress.     Appearance: She is well-developed. She is obese.   HENT:      Head: Normocephalic and atraumatic.   Eyes:      Pupils: Pupils are equal, round, and reactive to light.   Neck:      Thyroid: No thyromegaly.      Trachea: No tracheal deviation.   Cardiovascular:      Rate and Rhythm: Normal rate and regular rhythm.      Heart sounds: No murmur heard.  Pulmonary:      Effort: Pulmonary effort is normal. No respiratory distress.      Breath sounds: Normal breath sounds. No stridor. No wheezing.   Abdominal:      General: Bowel sounds are normal. There is distension.      Palpations: Abdomen is soft.      Tenderness: There is abdominal tenderness.   Genitourinary:     Labia:         Right: No tenderness.         Left: No tenderness.       Vagina: Normal. No vaginal discharge.   Musculoskeletal:         General: Tenderness present. No deformity. Normal range of motion.      Cervical back: Normal range of motion.   Skin:     General: Skin is warm and dry.      Capillary Refill: Capillary refill takes less than 2 seconds.      Coloration: Skin is not pale.      Findings: No erythema or rash.   Neurological:      Mental Status: She is alert and oriented to person, place, and time.      Cranial Nerves: No cranial nerve deficit.      Sensory: No sensory deficit.      Motor: Weakness present.      Coordination: Coordination normal.   Psychiatric:         Behavior: Behavior normal.         Thought Content: Thought content normal.         Judgment: Judgment normal.        Physical Exam    Result Review :     UA          7/24/2024    13:21 8/7/2024    15:06 10/14/2024    14:15   Urinalysis   Ketones, UA Negative  Negative  Negative    Leukocytes, UA Small (1+)  Negative  Negative      Urine Culture          5/10/2024    13:19 7/24/2024     13:18 8/7/2024    15:02   Urine Culture   Urine Culture >100,000 CFU/mL Streptococcus agalactiae (Group B)  <25,000 CFU/mL Gram Negative Bacilli  No growth           Assessment and Plan    Problem List Items Addressed This Visit          Genitourinary and Reproductive     Chronic cystitis with hematuria - Primary    Relevant Medications    tamsulosin (FLOMAX) 0.4 MG capsule 24 hr capsule    ketorolac (TORADOL) 10 MG tablet    promethazine (PHENERGAN) 25 MG tablet    Other Relevant Orders    POC Urinalysis Dipstick, Automated (Completed)    Urine Culture - Urine, Urine, Clean Catch    Case Request (Completed)    Frequency of micturition    Relevant Medications    tamsulosin (FLOMAX) 0.4 MG capsule 24 hr capsule    promethazine (PHENERGAN) 25 MG tablet    Other Relevant Orders    Bladder Scan (Completed)    Case Request (Completed)    Dyspareunia in female    Relevant Medications    ketorolac (TORADOL) 10 MG tablet    promethazine (PHENERGAN) 25 MG tablet    Other Relevant Orders    Case Request (Completed)       Other    Bladder pain syndrome    Relevant Medications    tamsulosin (FLOMAX) 0.4 MG capsule 24 hr capsule    ketorolac (TORADOL) 10 MG tablet    promethazine (PHENERGAN) 25 MG tablet    Other Relevant Orders    Case Request (Completed)         Assessment & Plan         Assessment    CHRONIC IC VS HEMORRHAGIC CYSTITIS/BLADDER /PELVIC PAIN/HEMATURIA/DYSPAREUNIA  MS KAROLYN KAUFFMAN  is a pleasant 34-year-old female who returns to clinic today for evaluation.  This is a 9 weeks follow-up with prior complaints of bladder pain and discomfort, acute cystitis with hematuria, urinary symptoms of frequency urgency and dysuria, gross Hematuria.  When last evaluated on 8/7/2024 patient was in no apparent discomfort and reported a significant improvement in her prior symptoms.  She has done relatively well until recently she has another flareup post intercourse.  Patient reports persistent bladder pain and discomfort which  gets her doubled over and sleep is hard to come by.  She would like to proceed with another bladder hydrodistention.     She has a history of chronic interstitial cystitis, post bladder Botox, PCOS, PTSD, bipolar one disorder, and recurrent UTIs.  However her last urinalysis was negative for bacterial growth.  We did recheck her urine for infection secondary to patient reports of recurrent episodes of hematuria, characterized by clear and prolonged urine clots, occasionally presenting as bloody spots.  Her urine culture was also negative.  Her urinalysis today is also negative for leukocyte esterase, it is negative for nitrites, it is negative for gross/microscopic hematuria.  Her PVR is 0.     Interstitial cystitis-Again, we discussed the diagnosis and management of this condition.  I indicated that it was a multifactorial condition with multifactorial symptomatology, Including frequency, urgency, the sensation of urinary tract infection in the absence of a positive culture, sexual symptomatology including significant dyspareunia.       We discussed treatment options including the importance of making a clinical diagnosis and the cystoscopic findings including Hunner's ulcers etc.  We also discussed medical management including pharmacologic treatment such as amitriptyline, naturopathic treatments such as pumpkin oil and which more aggressive options of Botox injections as well as the relative risks and merits of this.  We also discussed the use of dietary manipulation including the over-the-counter product relief which basically decreases the acid in the urine and avoidance of ascitic-containing foods such as citrus is etc.Sjogren's syndrome     Discussed Microscopic Hematuria with patient. She has been VERY Symptomatic for gross hematuria. However, pt educated on possible causes such as infection in the bladder, kidney, or bladder discomfort, trauma. vigorous exercise, different kinds of cancers, viral illness,  such as hepatitis a virus that causes liver disease and inflammation of the liver and sexual activity.  WE Discussed the fact that there is about a 96% chance of a negative workup with episodes of microscopic hematuria and with much greater in the face of Gross hematuria.  We discussed the fact that this is a non-cumulative test.  In other words if there is hematuria next year I would recommend continuing to work up the condition because of the fact that neoplasms may be small at the first workup and easily are missed.   We discussed the fact that if there is any history of chronic kidney disease or risk factors such as diabetes for contrast a noncontrasted study will be utilized.  We will initiate an investigation.     IBS- Patient has significant irritable bowel syndrome, characterized by extreme amounts of constipation.  We spoke about the impact of this on bladder function.  We spoke about  its relationship to recurrent urinary tract infections. We discussed the need for increasing p.o. fluid intake to at least 2 to 3 L of water daily, and discussed the physiology of colonic motility as well as use of MiraLAX as a bulk laxative versus the newer class of serotonin uptake blockers such as Linzess.  We stressed the need for a daily bowel movement and discussed the [place] stool scale at length.We also discussed a referral to the GI nurse practitioner                                          PLAN  Patient has been rescheduled for cystoscopy bladder hydrodistention procedure on 11/06/2024 Dr. Bhatt-CHRONIC IC    We will resend her urine for culture, I will call HER with results if any positive bacterial growth.     She will continue Pyridium 200 mg as needed for bladder pain and discomfort     We discussed Starting antibiotic therapy with Macrobid 100 mg nightly - suppressive therapy ONLY IF POSITIVE CULTURE- Deferred      I recommend concomitant probiotics with treatment with antibiotics to protect the rectal  reservoir including over-the-counter yogurt preparations to tami oral pills containing the appropriate probiotics.     Patient has been encouraged to increase p.o. fluid intake to at least 1 to 2 L daily avoiding bladder irritants such as citrusy, spicy and caffeine      We discussed the use of both an upper and lower tract investigation.  I discussed the fact that an upper tract investigation includes a  CT scan with contrast being the gold standard to diagnose the small neoplasms-has been scheduled stat CT, reviewed also negative     We discussed  lower tract investigation consisting of a cystoscopy due to positive history of smoking x20+ years-patient has been scheduled for cystoscopy bladder hydrodistention on procedure [place] Dr. Bhatt on 05/22/2024-unremarkable    We discussed OTHER  treatment options for HER bladder pain /dyspareunia with patient encouraged to continue conservative therapy alternating NSAID/Tylenol as tolerated, Also including hot baths, showers, warm compresses to lower back as tolerated. Also encouraged walking, physical therapy, light exercises as tolerated    Also encouraged lubrication during intercourse, continue Pyridium as needed, Elavil nightly for bladder pain and discomfort    Rest is the most important treatment in the case of bladder pain and post intercourse vaginal pain.  Did explain to patient this is a trigger for cystitis.  Rest and physical therapy are enough to improve minor pain. Discussed to monitor his daily routine with prevention of flank pain by: At least Drinking 8 glasses of water per day, Limiting your alcohol consumption.  Having a healthy diet containing fruits, vegetables, and a lot of fluids, Practicing safe sex.  Also maintaining proper hygiene of your body as well as the environment.    FOLLOW UP WITH GI-ABDOMINAL PAIN, IBS-C    Encouraged she continue her daily stool softeners    We discussed obtaining a CT urogram for her microhematuria if it persists.      If the patient's microscopic hematuria work-up is negative, per AUA guidelines I would recommend a repeat urinalysis via the patient's primary care provider in 12 months.  If the repeat urinalysis is positive, the patient and I will then need to have a shared decision-making conversation regarding further work-up versus observation, given the low likelihood of identifying etiology in this situation.  If patient were to develop gross hematuria in the interim, the patient would need a total re-evaluation.            She will follow-up in clinic postprocedure for reevaluation,     She may return sooner if need be     The patient is Agreeable with plan of care    1. Chronic interstitial cystitis and overactive bladder-bladder spasms-IBS.  She will continue conservative therapy, reports doing significantly better.    Patient reports that she is not currently experiencing any symptoms of urinary incontinence.    RADIOLOGY (CT AND/OR KUB):    CT Abdomen and Pelvis: Results for orders placed in visit on 05/10/24    CT Abdomen Pelvis With & Without Contrast    Narrative  EXAM:  CT Abdomen and Pelvis Without and With Intravenous Contrast    EXAM DATE:  5/10/2024 2:09 PM    CLINICAL HISTORY:  GROSS HEMATURIA/ABDOMINAL FLANK PAIN; R31.0-Gross hematuria;  N30.21-Other chronic cystitis with hematuria; R10.30-Lower abdominal  pain, unspecified    TECHNIQUE:  Axial computed tomography images of the abdomen and pelvis without and  with intravenous contrast.  Sagittal and coronal reformatted images were  created and reviewed.  This CT exam was performed using one or more of  the following dose reduction techniques:  automated exposure control,  adjustment of the mA and/or kV according to patient size, and/or use of  iterative reconstruction technique.    COMPARISON:  3/29/2017    FINDINGS:  Lung bases:  Lung bases are clear.  No consolidation.    ABDOMEN:  Liver:  Very mild fatty infiltration of liver.  Gallbladder and bile ducts:   Unremarkable as visualized.  No calcified  stones.  No ductal dilation.  Pancreas:  Unremarkable as visualized.  No mass.  No ductal dilation.  Spleen:  Unremarkable as visualized.  No splenomegaly.  Adrenals:  Unremarkable as visualized.  No mass.  Kidneys and ureters:  Unremarkable as visualized.  No renal or  ureteral stones. No obstructive uropathy.  No solid enhancing renal  masses or renal perfusion defects identified.  Stomach and bowel:  Unremarkable as visualized.  No obstruction.  No  mucosal thickening.    PELVIS:  Appendix:  Normal appendix.  Bladder:  There is mild urinary bladder wall thickening in part  related to incomplete distention but may also reflect changes of  cystitis.  No stones.  Reproductive:  Bilateral tubal ligation clips.  Small left ovarian  cysts, simple in appearance and is 2.6 cm.    ABDOMEN and PELVIS:  Intraperitoneal space:  Unremarkable as visualized.  No significant  fluid collection.  No pneumoperitoneum identified.  Bones/joints:  L5 pars defects with minimal anterior spondylolisthesis  of L5 on S1 and neuroforaminal stenosis at this level.  No acute  fracture.  No dislocation.  Soft tissues:  Unremarkable as visualized.  Vasculature:  Unremarkable as visualized.  No abdominal aortic  aneurysm.  Lymph nodes:  Unremarkable as visualized.  No enlarged lymph nodes.    Impression  1.  No renal or ureteral stones. No obstructive uropathy.  2.  No solid enhancing renal masses or renal perfusion defects  identified.  3.  There is mild urinary bladder wall thickening in part related to  incomplete distention but may also reflect changes of cystitis.  4.  L5 pars defects with minimal anterior spondylolisthesis of L5 on S1  and neuroforaminal stenosis at this level.  5. Other incidental/nonacute findings above.      This report was finalized on 5/10/2024 2:34 PM by Dr. Renzo Khan MD.     CT Stone Protocol: No results found for this or any previous visit.     KUB: No results found  for this or any previous visit.       [unfilled]  LABS (3 MONTHS):    Office Visit on 10/14/2024   Component Date Value Ref Range Status    Color 10/14/2024 Yellow  Yellow, Straw, Dark Yellow, Pam Final    Clarity, UA 10/14/2024 Clear  Clear Final    Specific Gravity  10/14/2024 1.000 (A)  1.005 - 1.030 Final    pH, Urine 10/14/2024 6.0  5.0 - 8.0 Final    Leukocytes 10/14/2024 Negative  Negative Final    Nitrite, UA 10/14/2024 Negative  Negative Final    Protein, POC 10/14/2024 Negative  Negative mg/dL Final    Glucose, UA 10/14/2024 Negative  Negative mg/dL Final    Ketones, UA 10/14/2024 Negative  Negative Final    Urobilinogen, UA 10/14/2024 Normal  Normal, 0.2 E.U./dL Final    Bilirubin 10/14/2024 Negative  Negative Final    Blood, UA 10/14/2024 Negative  Negative Final    Lot Number 10/14/2024 [id number]   Final    Expiration Date 10/14/2024 102,024   Final   Office Visit on 08/07/2024   Component Date Value Ref Range Status    Color 08/07/2024 Yellow  Yellow, Straw, Dark Yellow, Pam Final    Clarity, UA 08/07/2024 Clear  Clear Final    Specific Gravity  08/07/2024 1.010  1.005 - 1.030 Final    pH, Urine 08/07/2024 5.5  5.0 - 8.0 Final    Leukocytes 08/07/2024 Negative  Negative Final    Nitrite, UA 08/07/2024 Negative  Negative Final    Protein, POC 08/07/2024 Negative  Negative mg/dL Final    Glucose, UA 08/07/2024 Negative  Negative mg/dL Final    Ketones, UA 08/07/2024 Negative  Negative Final    Urobilinogen, UA 08/07/2024 Normal  Normal, 0.2 E.U./dL Final    Bilirubin 08/07/2024 Negative  Negative Final    Blood, UA 08/07/2024 Negative  Negative Final    Lot Number 08/07/2024 n   Final    Expiration Date 08/07/2024 n   Final    Urine Culture 08/07/2024 No growth   Final   Office Visit on 07/24/2024   Component Date Value Ref Range Status    Color 07/24/2024 Yellow  Yellow, Straw, Dark Yellow, Pam Final    Clarity, UA 07/24/2024 Clear  Clear Final    Specific Gravity  07/24/2024 1.000 (A)   1.005 - 1.030 Final    pH, Urine 07/24/2024 6.0  5.0 - 8.0 Final    Leukocytes 07/24/2024 Small (1+) (A)  Negative Final    Nitrite, UA 07/24/2024 Negative  Negative Final    Protein, POC 07/24/2024 Negative  Negative mg/dL Final    Glucose, UA 07/24/2024 Negative  Negative mg/dL Final    Ketones, UA 07/24/2024 Negative  Negative Final    Urobilinogen, UA 07/24/2024 Normal  Normal, 0.2 E.U./dL Final    Bilirubin 07/24/2024 Negative  Negative Final    Blood, UA 07/24/2024 Negative  Negative Final    Lot Number 07/24/2024 [id number]   Final    Expiration Date 07/24/2024 10/25/2024   Final    Urine Culture 07/24/2024 <25,000 CFU/mL Gram Negative Bacilli (A)   Final        Follow Up   Return in about 23 days (around 11/6/2024) for Next scheduled follow up, With Dr. Bhatt, Cystoscopy IN [place] BLADDER HYDRODISTENTION - CHRONIC IC.    Patient was given instructions and counseling regarding her condition or for health maintenance advice. Please see specific information pulled into the AVS if appropriate.          This document has been electronically signed by Griselda Cheng-Akwa, APRN   October 14, 2024 20:41 EDT      Dictated Utilizing Dragon Dictation: Part of this note may be an electronic transcription/translation of spoken language to printed text using the Dragon Dictation System.      Patient or patient representative verbalized consent for the use of Ambient Listening during the visit with  Griselda Cheng-Akwa, APRN for chart documentation. 10/14/2024  15:34 EDT

## 2024-10-15 ENCOUNTER — TELEPHONE (OUTPATIENT)
Dept: UROLOGY | Facility: CLINIC | Age: 34
End: 2024-10-15
Payer: MEDICAID

## 2024-10-15 LAB — BACTERIA SPEC AEROBE CULT: NO GROWTH

## 2024-10-15 NOTE — TELEPHONE ENCOUNTER
----- Message from Griselda Cheng-Akwa sent at 10/15/2024  1:29 PM EDT -----  The patient know her urine culture results are completely negative for any bacterial infection at this time.    Urine Culture -No growth    However she may drop off another urine dip each time she feels symptomatic.    Follow-up in clinic as discussed.    Thank you

## 2024-10-30 NOTE — DISCHARGE INSTRUCTIONS
TAKE the following medications the morning of surgery:      Please discontinue all blood thinners and anticoagulants (except aspirin) prior to surgery as per your surgeon and cardiologist instructions.  Aspirin may be continued up to the day prior to surgery.    HOLD all diabetic medications the morning of surgery as order by physician.    Please follow instructions on use of prep cloths provided by nurse. Return instruction sheet to pre-op nurse on day of surgery.    Arrival time for surgery on   will be given to you by Dr. Valdez.    A RESPONSIBLE PERSON MUST REMAIN IN THE WAITING ROOM DURING YOUR PROCEDURE AND A RESPONSIBLE  MUST BE AVAILABLE UPON YOUR DISCHARGE.    General Instructions:  Do NOT eat or drink after midnight which includes water, mints, or gum.  You may brush your teeth. Dental appliances that are removable must be taken out day of surgery.  Do NOT smoke, chew tobacco, or drink alcohol within 24 hours prior to surgery.  Bring medications in original bottles, any inhalers and if applicable your C-PAP/BI-PAP machine  Bring any papers given to you in the doctor’s office  Wear clean, comfortable clothes and socks  Do NOT wear contact lenses or make-up or dark nail polish.  Bring a case for your glasses if applicable.  Bring crutches or walker if applicable  Leave all other valuables and jewelry at home  If you were given a blood bank armband, continue to wear it until discharged.    Preventing a Surgical Site Infection:  Shower the night before surgery (unless instructed otherwise) using a fresh bar of anti-bacterial soap (such as Dial) and clean washcloth.  Dry with a clean towel and dress in clean clothing.  For 2 to 3 days before surgery, avoid shaving with a razor near where you will have surgery because the razor can irritate skin and make it easier to develop an infection.  Ask your surgeon if you will be receiving antibiotics prior to surgery.  Make sure you, your family, and all  healthcare providers clean their hands with soap and water or an alcohol-based hand  before caring for you or your wound.  If at all possible, quit smoking as many days before surgery as you can.    Day of Surgery:  Upon arrival, a pre-op nurse and anesthesiologist will review your health history, obtain vital signs, and answer questions you may have.  The only belongings needed at this time will be your home medications and if applicable you C-PAP/BI-PAP machine.  If you are staying overnight, your family can leave the rest of your belongings in the car and bring them to your room later.  A pre-op nurse will start an IV and you may receive medication in preparation for surgery.  Due to patient privacy and limited space, only one member of your family will be able to accompany you in the pre-op area.  While you are in surgery your family should notify the waiting room  if they leave the waiting room area and provide a contact number.  Please be aware that surgery does come with discomfort.  We want to make every effort to control your discomfort so please discuss any uncontrolled symptoms with your nurse.  Your doctor will most likely have prescribed pain medications.  If you are going home after surgery you will receive individualized written care instructions before being discharged.  A responsible adult must drive you to and from the hospital on the day of surgery and stay with you for 24 hours.  If you are staying overnight following surgery, you will be transported to your hospital room following the recovery period.

## 2024-11-01 ENCOUNTER — PRE-ADMISSION TESTING (OUTPATIENT)
Dept: PREADMISSION TESTING | Facility: HOSPITAL | Age: 34
End: 2024-11-01
Payer: MEDICAID

## 2024-11-01 VITALS — WEIGHT: 215 LBS | BODY MASS INDEX: 34.72 KG/M2

## 2024-11-01 LAB
ANION GAP SERPL CALCULATED.3IONS-SCNC: 10.1 MMOL/L (ref 5–15)
BUN SERPL-MCNC: 6 MG/DL (ref 6–20)
BUN/CREAT SERPL: 6.8 (ref 7–25)
CALCIUM SPEC-SCNC: 10.3 MG/DL (ref 8.6–10.5)
CHLORIDE SERPL-SCNC: 108 MMOL/L (ref 98–107)
CO2 SERPL-SCNC: 22.9 MMOL/L (ref 22–29)
CREAT SERPL-MCNC: 0.88 MG/DL (ref 0.57–1)
DEPRECATED RDW RBC AUTO: 46.3 FL (ref 37–54)
EGFRCR SERPLBLD CKD-EPI 2021: 87.5 ML/MIN/1.73
ERYTHROCYTE [DISTWIDTH] IN BLOOD BY AUTOMATED COUNT: 14.4 % (ref 12.3–15.4)
GLUCOSE SERPL-MCNC: 88 MG/DL (ref 65–99)
HCT VFR BLD AUTO: 41.6 % (ref 34–46.6)
HGB BLD-MCNC: 12.6 G/DL (ref 12–15.9)
MCH RBC QN AUTO: 26.3 PG (ref 26.6–33)
MCHC RBC AUTO-ENTMCNC: 30.3 G/DL (ref 31.5–35.7)
MCV RBC AUTO: 86.7 FL (ref 79–97)
PLATELET # BLD AUTO: 377 10*3/MM3 (ref 140–450)
PMV BLD AUTO: 8.7 FL (ref 6–12)
POTASSIUM SERPL-SCNC: 4.7 MMOL/L (ref 3.5–5.2)
RBC # BLD AUTO: 4.8 10*6/MM3 (ref 3.77–5.28)
SODIUM SERPL-SCNC: 141 MMOL/L (ref 136–145)
WBC NRBC COR # BLD AUTO: 8.07 10*3/MM3 (ref 3.4–10.8)

## 2024-11-01 PROCEDURE — 36415 COLL VENOUS BLD VENIPUNCTURE: CPT

## 2024-11-01 PROCEDURE — 80048 BASIC METABOLIC PNL TOTAL CA: CPT

## 2024-11-01 PROCEDURE — 85027 COMPLETE CBC AUTOMATED: CPT

## 2024-11-06 ENCOUNTER — ANESTHESIA (OUTPATIENT)
Dept: PERIOP | Facility: HOSPITAL | Age: 34
End: 2024-11-06
Payer: MEDICAID

## 2024-11-06 ENCOUNTER — APPOINTMENT (OUTPATIENT)
Dept: GENERAL RADIOLOGY | Facility: HOSPITAL | Age: 34
End: 2024-11-06
Payer: MEDICAID

## 2024-11-06 ENCOUNTER — HOSPITAL ENCOUNTER (OUTPATIENT)
Facility: HOSPITAL | Age: 34
Setting detail: HOSPITAL OUTPATIENT SURGERY
Discharge: HOME | End: 2024-11-06
Attending: UROLOGY | Admitting: UROLOGY
Payer: MEDICAID

## 2024-11-06 ENCOUNTER — ANESTHESIA EVENT (OUTPATIENT)
Dept: PERIOP | Facility: HOSPITAL | Age: 34
End: 2024-11-06
Payer: MEDICAID

## 2024-11-06 VITALS
HEART RATE: 75 BPM | DIASTOLIC BLOOD PRESSURE: 64 MMHG | HEIGHT: 66 IN | BODY MASS INDEX: 34.72 KG/M2 | OXYGEN SATURATION: 97 % | TEMPERATURE: 98 F | RESPIRATION RATE: 16 BRPM | WEIGHT: 216 LBS | SYSTOLIC BLOOD PRESSURE: 102 MMHG

## 2024-11-06 DIAGNOSIS — N94.10 DYSPAREUNIA IN FEMALE: ICD-10-CM

## 2024-11-06 DIAGNOSIS — R35.0 FREQUENCY OF MICTURITION: ICD-10-CM

## 2024-11-06 DIAGNOSIS — N30.21 CHRONIC CYSTITIS WITH HEMATURIA: ICD-10-CM

## 2024-11-06 DIAGNOSIS — N30.10 BLADDER PAIN SYNDROME: ICD-10-CM

## 2024-11-06 PROCEDURE — 25010000002 PROPOFOL 200 MG/20ML EMULSION: Performed by: NURSE ANESTHETIST, CERTIFIED REGISTERED

## 2024-11-06 PROCEDURE — 25010000002 ONDANSETRON PER 1 MG: Performed by: NURSE ANESTHETIST, CERTIFIED REGISTERED

## 2024-11-06 PROCEDURE — 25010000002 FENTANYL CITRATE (PF) 50 MCG/ML SOLUTION: Performed by: NURSE ANESTHETIST, CERTIFIED REGISTERED

## 2024-11-06 PROCEDURE — 52260 CYSTOSCOPY AND TREATMENT: CPT | Performed by: UROLOGY

## 2024-11-06 PROCEDURE — 81025 URINE PREGNANCY TEST: CPT | Performed by: ANESTHESIOLOGY

## 2024-11-06 PROCEDURE — 25010000002 LIDOCAINE PF 2% 2 % SOLUTION: Performed by: NURSE ANESTHETIST, CERTIFIED REGISTERED

## 2024-11-06 PROCEDURE — 25010000002 GENTAMICIN PER 80 MG: Performed by: UROLOGY

## 2024-11-06 PROCEDURE — 25010000002 MIDAZOLAM PER 1 MG: Performed by: NURSE ANESTHETIST, CERTIFIED REGISTERED

## 2024-11-06 PROCEDURE — 25010000002 HYDROMORPHONE 1 MG/ML SOLUTION: Performed by: ANESTHESIOLOGY

## 2024-11-06 PROCEDURE — 25010000002 MEPERIDINE PER 100 MG: Performed by: NURSE ANESTHETIST, CERTIFIED REGISTERED

## 2024-11-06 RX ORDER — FAMOTIDINE 10 MG/ML
INJECTION, SOLUTION INTRAVENOUS AS NEEDED
Status: DISCONTINUED | OUTPATIENT
Start: 2024-11-06 | End: 2024-11-06 | Stop reason: SURG

## 2024-11-06 RX ORDER — SODIUM CHLORIDE 0.9 % (FLUSH) 0.9 %
10 SYRINGE (ML) INJECTION EVERY 12 HOURS SCHEDULED
Status: DISCONTINUED | OUTPATIENT
Start: 2024-11-06 | End: 2024-11-06 | Stop reason: HOSPADM

## 2024-11-06 RX ORDER — LIDOCAINE HYDROCHLORIDE 20 MG/ML
JELLY TOPICAL AS NEEDED
Status: DISCONTINUED | OUTPATIENT
Start: 2024-11-06 | End: 2024-11-06 | Stop reason: HOSPADM

## 2024-11-06 RX ORDER — SODIUM CHLORIDE, SODIUM LACTATE, POTASSIUM CHLORIDE, CALCIUM CHLORIDE 600; 310; 30; 20 MG/100ML; MG/100ML; MG/100ML; MG/100ML
100 INJECTION, SOLUTION INTRAVENOUS ONCE AS NEEDED
Status: DISCONTINUED | OUTPATIENT
Start: 2024-11-06 | End: 2024-11-06 | Stop reason: HOSPADM

## 2024-11-06 RX ORDER — OXYCODONE AND ACETAMINOPHEN 5; 325 MG/1; MG/1
1 TABLET ORAL ONCE AS NEEDED
Status: COMPLETED | OUTPATIENT
Start: 2024-11-06 | End: 2024-11-06

## 2024-11-06 RX ORDER — PROPOFOL 10 MG/ML
INJECTION, EMULSION INTRAVENOUS CONTINUOUS PRN
Status: DISCONTINUED | OUTPATIENT
Start: 2024-11-06 | End: 2024-11-06 | Stop reason: SURG

## 2024-11-06 RX ORDER — SODIUM CHLORIDE 9 MG/ML
40 INJECTION, SOLUTION INTRAVENOUS AS NEEDED
Status: DISCONTINUED | OUTPATIENT
Start: 2024-11-06 | End: 2024-11-06 | Stop reason: HOSPADM

## 2024-11-06 RX ORDER — LIDOCAINE HYDROCHLORIDE 20 MG/ML
INJECTION, SOLUTION EPIDURAL; INFILTRATION; INTRACAUDAL; PERINEURAL AS NEEDED
Status: DISCONTINUED | OUTPATIENT
Start: 2024-11-06 | End: 2024-11-06 | Stop reason: SURG

## 2024-11-06 RX ORDER — MIDAZOLAM HYDROCHLORIDE 1 MG/ML
INJECTION, SOLUTION INTRAMUSCULAR; INTRAVENOUS AS NEEDED
Status: DISCONTINUED | OUTPATIENT
Start: 2024-11-06 | End: 2024-11-06 | Stop reason: SURG

## 2024-11-06 RX ORDER — SODIUM CHLORIDE, SODIUM LACTATE, POTASSIUM CHLORIDE, CALCIUM CHLORIDE 600; 310; 30; 20 MG/100ML; MG/100ML; MG/100ML; MG/100ML
125 INJECTION, SOLUTION INTRAVENOUS ONCE
Status: DISCONTINUED | OUTPATIENT
Start: 2024-11-06 | End: 2024-11-06 | Stop reason: HOSPADM

## 2024-11-06 RX ORDER — MEPERIDINE HYDROCHLORIDE 25 MG/ML
12.5 INJECTION INTRAMUSCULAR; INTRAVENOUS; SUBCUTANEOUS
Status: COMPLETED | OUTPATIENT
Start: 2024-11-06 | End: 2024-11-06

## 2024-11-06 RX ORDER — IPRATROPIUM BROMIDE AND ALBUTEROL SULFATE 2.5; .5 MG/3ML; MG/3ML
3 SOLUTION RESPIRATORY (INHALATION) ONCE AS NEEDED
Status: DISCONTINUED | OUTPATIENT
Start: 2024-11-06 | End: 2024-11-06 | Stop reason: HOSPADM

## 2024-11-06 RX ORDER — ONDANSETRON 2 MG/ML
4 INJECTION INTRAMUSCULAR; INTRAVENOUS AS NEEDED
Status: DISCONTINUED | OUTPATIENT
Start: 2024-11-06 | End: 2024-11-06 | Stop reason: HOSPADM

## 2024-11-06 RX ORDER — FENTANYL CITRATE 50 UG/ML
50 INJECTION, SOLUTION INTRAMUSCULAR; INTRAVENOUS
Status: DISCONTINUED | OUTPATIENT
Start: 2024-11-06 | End: 2024-11-06 | Stop reason: HOSPADM

## 2024-11-06 RX ORDER — FENTANYL CITRATE 50 UG/ML
INJECTION, SOLUTION INTRAMUSCULAR; INTRAVENOUS AS NEEDED
Status: DISCONTINUED | OUTPATIENT
Start: 2024-11-06 | End: 2024-11-06 | Stop reason: SURG

## 2024-11-06 RX ORDER — GENTAMICIN SULFATE 80 MG/100ML
80 INJECTION, SOLUTION INTRAVENOUS ONCE
Status: COMPLETED | OUTPATIENT
Start: 2024-11-06 | End: 2024-11-06

## 2024-11-06 RX ORDER — ONDANSETRON 2 MG/ML
INJECTION INTRAMUSCULAR; INTRAVENOUS AS NEEDED
Status: DISCONTINUED | OUTPATIENT
Start: 2024-11-06 | End: 2024-11-06 | Stop reason: SURG

## 2024-11-06 RX ORDER — HYDROCODONE BITARTRATE AND ACETAMINOPHEN 10; 325 MG/1; MG/1
1 TABLET ORAL EVERY 6 HOURS PRN
Qty: 12 TABLET | Refills: 0 | Status: SHIPPED | OUTPATIENT
Start: 2024-11-06

## 2024-11-06 RX ORDER — SODIUM CHLORIDE 0.9 % (FLUSH) 0.9 %
10 SYRINGE (ML) INJECTION AS NEEDED
Status: DISCONTINUED | OUTPATIENT
Start: 2024-11-06 | End: 2024-11-06 | Stop reason: HOSPADM

## 2024-11-06 RX ORDER — KETOROLAC TROMETHAMINE 30 MG/ML
30 INJECTION, SOLUTION INTRAMUSCULAR; INTRAVENOUS EVERY 6 HOURS PRN
Status: DISCONTINUED | OUTPATIENT
Start: 2024-11-06 | End: 2024-11-06 | Stop reason: HOSPADM

## 2024-11-06 RX ORDER — MIDAZOLAM HYDROCHLORIDE 1 MG/ML
1 INJECTION, SOLUTION INTRAMUSCULAR; INTRAVENOUS
Status: DISCONTINUED | OUTPATIENT
Start: 2024-11-06 | End: 2024-11-06 | Stop reason: HOSPADM

## 2024-11-06 RX ADMIN — MEPERIDINE HYDROCHLORIDE 12.5 MG: 25 INJECTION INTRAMUSCULAR; INTRAVENOUS; SUBCUTANEOUS at 15:06

## 2024-11-06 RX ADMIN — FENTANYL CITRATE 50 MCG: 50 INJECTION, SOLUTION INTRAMUSCULAR; INTRAVENOUS at 15:03

## 2024-11-06 RX ADMIN — MEPERIDINE HYDROCHLORIDE 12.5 MG: 25 INJECTION INTRAMUSCULAR; INTRAVENOUS; SUBCUTANEOUS at 15:02

## 2024-11-06 RX ADMIN — MIDAZOLAM HYDROCHLORIDE 2 MG: 1 INJECTION, SOLUTION INTRAMUSCULAR; INTRAVENOUS at 14:13

## 2024-11-06 RX ADMIN — PROPOFOL 100 MCG/KG/MIN: 10 INJECTION, EMULSION INTRAVENOUS at 14:13

## 2024-11-06 RX ADMIN — HYDROMORPHONE HYDROCHLORIDE 1 MG: 1 INJECTION, SOLUTION INTRAMUSCULAR; INTRAVENOUS; SUBCUTANEOUS at 15:13

## 2024-11-06 RX ADMIN — OXYCODONE HYDROCHLORIDE AND ACETAMINOPHEN 1 TABLET: 5; 325 TABLET ORAL at 15:00

## 2024-11-06 RX ADMIN — LIDOCAINE HYDROCHLORIDE 60 MG: 20 INJECTION, SOLUTION EPIDURAL; INFILTRATION; INTRACAUDAL; PERINEURAL at 14:13

## 2024-11-06 RX ADMIN — FENTANYL CITRATE 100 MCG: 50 INJECTION INTRAMUSCULAR; INTRAVENOUS at 14:13

## 2024-11-06 RX ADMIN — FAMOTIDINE 20 MG: 10 INJECTION, SOLUTION INTRAVENOUS at 14:13

## 2024-11-06 RX ADMIN — ONDANSETRON 4 MG: 2 INJECTION INTRAMUSCULAR; INTRAVENOUS at 14:18

## 2024-11-06 RX ADMIN — GENTAMICIN SULFATE 80 MG: 80 INJECTION, SOLUTION INTRAVENOUS at 14:13

## 2024-11-06 NOTE — ANESTHESIA POSTPROCEDURE EVALUATION
Patient: Dawna Stevens    Procedure Summary       Date: 11/06/24 Room / Location: [facility] OR 06 /  [facility] OR    Anesthesia Start: 1412 Anesthesia Stop: 1430    Procedure: CYSTOSCOPY BLADDER HYDRODISTENSION Diagnosis:       Chronic cystitis with hematuria      Frequency of micturition      Bladder pain syndrome      Dyspareunia in female      (Chronic cystitis with hematuria [N30.21])      (Frequency of micturition [R35.0])      (Bladder pain syndrome [N30.10])      (Dyspareunia in female [N94.10])    Surgeons: Charles Bhatt MD Provider: Patricio Chowdary MD    Anesthesia Type: general ASA Status: 3            Anesthesia Type: general    Vitals  Vitals Value Taken Time   /66 11/06/24 1446   Temp 97.7 °F (36.5 °C) 11/06/24 1432   Pulse 86 11/06/24 1446   Resp 14 11/06/24 1446   SpO2 94 % 11/06/24 1446           Post Anesthesia Care and Evaluation    Patient location during evaluation: bedside  Patient participation: complete - patient participated  Level of consciousness: awake and alert  Pain score: 1  Pain management: adequate    Airway patency: patent  Anesthetic complications: No anesthetic complications  PONV Status: none  Cardiovascular status: acceptable  Respiratory status: acceptable  Hydration status: acceptable

## 2024-11-06 NOTE — ANESTHESIA PREPROCEDURE EVALUATION
Anesthesia Evaluation     Patient summary reviewed and Nursing notes reviewed   no history of anesthetic complications:   NPO Solid Status: > 8 hours  NPO Liquid Status: > 8 hours           Airway   Mallampati: II  TM distance: >3 FB  Neck ROM: full  No difficulty expected  Dental - normal exam     Pulmonary - negative pulmonary ROS and normal exam   Cardiovascular - negative cardio ROS and normal exam  Exercise tolerance: good (4-7 METS)    NYHA Classification: II  ECG reviewed  Patient on routine beta blocker and Beta blocker given within 24 hours of surgery  Rhythm: regular  Rate: normal        Neuro/Psych  (+) psychiatric history PTSD  GI/Hepatic/Renal/Endo    (+) obesity, GERD, renal disease-    Musculoskeletal (-) negative ROS    Abdominal  - normal exam    Bowel sounds: normal.   Substance History - negative use     OB/GYN negative ob/gyn ROS         Other - negative ROS       ROS/Med Hx Other:   Normal sinus rhythm with sinus arrhythmia  Poor R wave progression   Abnormal ECG  No previous ECGs available  Confirmed by Ajith Otto ([id number]) on 10/26/2021 8:27:59 PM                      Anesthesia Plan    ASA 3     general     intravenous induction     Anesthetic plan, risks, benefits, and alternatives have been provided, discussed and informed consent has been obtained with: patient.  Pre-procedure education provided  Use of blood products discussed with  Consented to blood products.    Plan discussed with CRNA.

## 2024-11-06 NOTE — OP NOTE
CYSTOSCOPY BLADDER HYDRODISTENSION  Procedure Note    Dawna Stevens  11/6/2024    Pre-op Diagnosis:   Chronic cystitis with hematuria [N30.21]  Frequency of micturition [R35.0]  Bladder pain syndrome [N30.10]  Dyspareunia in female [N94.10]    Post-op Diagnosis:     Post-Op Diagnosis Codes:     * Chronic cystitis with hematuria [N30.21]     * Frequency of micturition [R35.0]     * Bladder pain syndrome [N30.10]     * Dyspareunia in female [N94.10]    Procedure/CPT® Codes:  34-year-old white female with severe interstitial cystitis who had an excellent response after the first dilation had an obvious Hunner's ulcer presents today for repeat dilation and the possibility of injection of the ulcer if present following an informed consent brought the procedure suite prepped and draped in a low dorsolithotomy position.  The 21 Austrian cystoscope identified a normal urethra normal bladder the Hunner's ulcers beautifully healed I then distended the bladder to well over 1000 cc with beautiful results and significant glomerulations and cracking I distended it additional 2 times running the water at 50 cc/min I was very pleased with the results he tolerated it well    Procedure(s):  CYSTOSCOPY BLADDER HYDRODISTENSION    Surgeon(s):  Charles Bhatt MD    Anesthesia: see anesthesia record    Staff:   Circulator: Cheryl Aaron RN  Scrub Person: Kaity Ferreira; Berto Acevedo    Estimated Blood Loss: none  Urine Voided: * No values recorded between 11/6/2024  2:12 PM and 11/6/2024  2:29 PM *    Specimens:                None      Drains: None    Findings: Lasix severe interstitial cystitis    Blood: N/A    Complications: None    Grafts and Implants: None    Charles Bhatt MD     Date: 11/6/2024  Time: 14:34 EST

## 2024-12-05 ENCOUNTER — OFFICE VISIT (OUTPATIENT)
Dept: UROLOGY | Facility: CLINIC | Age: 34
End: 2024-12-05
Payer: MEDICAID

## 2024-12-05 VITALS
HEART RATE: 80 BPM | BODY MASS INDEX: 36.32 KG/M2 | HEIGHT: 66 IN | WEIGHT: 226 LBS | SYSTOLIC BLOOD PRESSURE: 130 MMHG | DIASTOLIC BLOOD PRESSURE: 92 MMHG

## 2024-12-05 DIAGNOSIS — N30.10 INTERSTITIAL CYSTITIS: Primary | ICD-10-CM

## 2024-12-05 DIAGNOSIS — N30.10 BLADDER PAIN SYNDROME: ICD-10-CM

## 2024-12-05 DIAGNOSIS — K58.2 IRRITABLE BOWEL SYNDROME WITH BOTH CONSTIPATION AND DIARRHEA: ICD-10-CM

## 2024-12-05 LAB
BILIRUB BLD-MCNC: NEGATIVE MG/DL
CLARITY, POC: CLEAR
COLOR UR: YELLOW
EXPIRATION DATE: ABNORMAL
GLUCOSE UR STRIP-MCNC: NEGATIVE MG/DL
KETONES UR QL: NEGATIVE
LEUKOCYTE EST, POC: NEGATIVE
Lab: ABNORMAL
NITRITE UR-MCNC: NEGATIVE MG/ML
PH UR: 7 [PH] (ref 5–8)
PROT UR STRIP-MCNC: NEGATIVE MG/DL
RBC # UR STRIP: ABNORMAL /UL
SP GR UR: 1 (ref 1–1.03)
UROBILINOGEN UR QL: NORMAL

## 2024-12-05 RX ORDER — DOCUSATE SODIUM 100 MG/1
100 CAPSULE, LIQUID FILLED ORAL 2 TIMES DAILY
Qty: 60 CAPSULE | Refills: 1 | Status: SHIPPED | OUTPATIENT
Start: 2024-12-05

## 2024-12-05 NOTE — PROGRESS NOTES
"Chief Complaint:    Chief Complaint   Patient presents with    Post-op     Hydodistension        Vital Signs:   /92 (BP Location: Left arm, Patient Position: Sitting)   Pulse 80   Ht 167.6 cm (65.98\")   Wt 103 kg (226 lb)   BMI 36.50 kg/m²   Body mass index is 36.5 kg/m².      HPI:  Dawna Donaldson is a 34 y.o. female who presents today for follow up    History of Present Illness  Ms. donaldson returns to the clinic today for follow-up for interstitial cystitis and detrusor instability.  Patient underwent repeat cystoscopy with bladder hydrodistention by Dr. Bhatt on 11/6/2024.  She did have a significant Hunner ulcers which has healed since last cystoscopy in May of this year.  Patient's first hydrodistention went significantly well and she was having no acute problems until October of this year.  She is now roughly 1 month post surgical intervention and states she still has notable symptoms.  She reports that she did have an acute upper respiratory illness roughly 2 weeks postsurgery which complicated her issues.  She is still not having good regular bowel movements.  She has been using MiraLAX and Linzess and is only having a bowel movement roughly once weekly.  She feels significant nausea without vomiting, frequency, urgency, nocturia x 4-5, and pelvic pain.  Her gross hematuria has resolved.  She continues to take amitriptyline and Flomax daily.  She is not currently taking anything for bladder spasms.  Urine analysis in office today shows 3+ microscopic blood with no concerns of leukocytes or nitrites.      Past Medical History:  Past Medical History:   Diagnosis Date    Abnormal Pap smear of cervix     Anxiety and depression     Bipolar 1 disorder     GERD (gastroesophageal reflux disease)     History of transfusion     [facility]   D&C 2010    Interstitial cystitis     Kidney stone     PCOS (polycystic ovarian syndrome)     PTSD (post-traumatic stress disorder)     Pyelonephritis     Urinary " incontinence     Urinary tract infection        Current Meds:  Current Outpatient Medications   Medication Sig Dispense Refill    amitriptyline (ELAVIL) 10 MG tablet TAKE 1 TABLET BY MOUTH EVERY DAY 30 tablet 1    busPIRone (BUSPAR) 10 MG tablet Take 1 tablet by mouth 4 (Four) Times a Day. for anxiety      dicyclomine (BENTYL) 20 MG tablet Take 1 tablet by mouth Every 6 (Six) Hours.      HYDROcodone-acetaminophen (NORCO)  MG per tablet Take 1 tablet by mouth Every 6 (Six) Hours As Needed for Moderate Pain (Pain). 12 tablet 0    lamoTRIgine (LaMICtal) 25 MG tablet Take 1 tablet by mouth 2 (Two) Times a Day.      metFORMIN (GLUCOPHAGE) 1000 MG tablet Take 1 tablet by mouth 2 (Two) Times a Day With Meals.      mirtazapine (REMERON) 15 MG tablet Take 2 tablets by mouth Every Night.  4    multivitamin with minerals (WOMENS ONE DAILY PO) Take 1 tablet by mouth Daily.      Omega-3 Fatty Acids (fish oil) 1000 MG capsule capsule Take 1,200 mg by mouth Daily With Breakfast.      phenazopyridine (Pyridium) 200 MG tablet Take 1 tablet by mouth 3 (Three) Times a Day As Needed for Bladder Spasms. 20 tablet 0    polyethylene glycol (MiraLax) 17 g packet Take 17 g by mouth Daily. 30 each 3    promethazine (PHENERGAN) 25 MG tablet Take 1 tablet by mouth Every 12 (Twelve) Hours As Needed for Nausea. 20 tablet 0    propranolol (INDERAL) 20 MG tablet Take 1 tablet by mouth Daily.      tamsulosin (FLOMAX) 0.4 MG capsule 24 hr capsule Take 1 capsule by mouth Daily. 30 capsule 5    topiramate (TOPAMAX) 50 MG tablet Take 1 tablet by mouth 2 (Two) Times a Day.      docusate sodium (Colace) 100 MG capsule Take 1 capsule by mouth 2 (Two) Times a Day. 60 capsule 1    linaclotide (Linzess) 290 MCG capsule capsule Take 1 capsule by mouth Every Morning Before Breakfast. 90 capsule 3     No current facility-administered medications for this visit.        Allergies:   Allergies   Allergen Reactions    Latex Rash    Penicillins Nausea And  Vomiting        Past Surgical History:  Past Surgical History:   Procedure Laterality Date     SECTION      x3    COLONOSCOPY      and EGD    CYSTOSCOPY N/A 2024    Procedure: CYSTOSCOPY WITH URETHRAL DILATATION;  Surgeon: Charles Bhatt MD;  Location: [facility];  Service: Urology;  Laterality: N/A;    CYSTOSCOPY BLADDER HYDRODISTENSION N/A 2024    Procedure: CYSTOSCOPY BLADDER HYDRODISTENSION;  Surgeon: Charles Bhatt MD;  Location: [facility];  Service: Urology;  Laterality: N/A;    CYSTOSCOPY BLADDER HYDRODISTENSION N/A 2024    Procedure: CYSTOSCOPY BLADDER HYDRODISTENSION;  Surgeon: Charles Bhatt MD;  Location: [facility];  Service: Urology;  Laterality: N/A;    DILATATION AND CURETTAGE      ENDOSCOPY      TUBAL COAGULATION LAPAROSCOPIC Bilateral 10/27/2021    Procedure: BILATERAL TUBAL FALLOPE FILSHIE CLIPPING LAPAROSCOPIC;  Surgeon: Don Arce DO;  Location: [facility];  Service: Obstetrics/Gynecology;  Laterality: Bilateral;    WISDOM TOOTH EXTRACTION         Social History:  Social History     Socioeconomic History    Marital status:    Tobacco Use    Smoking status: Former     Current packs/day: 0.00     Average packs/day: 1 pack/day for 5.0 years (5.0 ttl pk-yrs)     Types: Cigarettes     Start date: 10/1/2010     Quit date: 10/1/2015     Years since quittin.1    Smokeless tobacco: Never   Vaping Use    Vaping status: Never Used   Substance and Sexual Activity    Alcohol use: No    Drug use: No    Sexual activity: Defer     Birth control/protection: Tubal ligation       Family History:  Family History   Adopted: Yes   Family history unknown: Yes       Review of Systems:  Review of Systems   Constitutional:  Negative for chills, fatigue and fever.   Respiratory:  Negative for cough, shortness of breath and wheezing.    Cardiovascular:  Negative for leg swelling.   Gastrointestinal:  Positive for abdominal pain, constipation and nausea.  Negative for vomiting.   Genitourinary:  Positive for flank pain, frequency, pelvic pain and urgency. Negative for difficulty urinating, dysuria and hematuria.   Musculoskeletal:  Negative for back pain and joint swelling.   Neurological:  Negative for dizziness and headaches.   Psychiatric/Behavioral:  Negative for confusion.        Physical Exam:  Physical Exam  Constitutional:       General: She is not in acute distress.     Appearance: Normal appearance.   HENT:      Head: Normocephalic and atraumatic.      Nose: Nose normal.      Mouth/Throat:      Mouth: Mucous membranes are moist.   Eyes:      Conjunctiva/sclera: Conjunctivae normal.   Cardiovascular:      Rate and Rhythm: Normal rate and regular rhythm.      Pulses: Normal pulses.      Heart sounds: Normal heart sounds.   Pulmonary:      Effort: Pulmonary effort is normal.      Breath sounds: Normal breath sounds.   Abdominal:      General: Bowel sounds are normal.      Palpations: Abdomen is soft.   Musculoskeletal:         General: Normal range of motion.      Cervical back: Normal range of motion.   Skin:     General: Skin is warm.   Neurological:      General: No focal deficit present.      Mental Status: She is alert and oriented to person, place, and time.   Psychiatric:         Mood and Affect: Mood normal.         Behavior: Behavior normal.         Thought Content: Thought content normal.         Judgment: Judgment normal.               Recent Image (CT and/or KUB):   CT Abdomen and Pelvis: Results for orders placed in visit on 05/10/24    CT Abdomen Pelvis With & Without Contrast    Narrative  EXAM:  CT Abdomen and Pelvis Without and With Intravenous Contrast    EXAM DATE:  5/10/2024 2:09 PM    CLINICAL HISTORY:  GROSS HEMATURIA/ABDOMINAL FLANK PAIN; R31.0-Gross hematuria;  N30.21-Other chronic cystitis with hematuria; R10.30-Lower abdominal  pain, unspecified    TECHNIQUE:  Axial computed tomography images of the abdomen and pelvis without  and  with intravenous contrast.  Sagittal and coronal reformatted images were  created and reviewed.  This CT exam was performed using one or more of  the following dose reduction techniques:  automated exposure control,  adjustment of the mA and/or kV according to patient size, and/or use of  iterative reconstruction technique.    COMPARISON:  3/29/2017    FINDINGS:  Lung bases:  Lung bases are clear.  No consolidation.    ABDOMEN:  Liver:  Very mild fatty infiltration of liver.  Gallbladder and bile ducts:  Unremarkable as visualized.  No calcified  stones.  No ductal dilation.  Pancreas:  Unremarkable as visualized.  No mass.  No ductal dilation.  Spleen:  Unremarkable as visualized.  No splenomegaly.  Adrenals:  Unremarkable as visualized.  No mass.  Kidneys and ureters:  Unremarkable as visualized.  No renal or  ureteral stones. No obstructive uropathy.  No solid enhancing renal  masses or renal perfusion defects identified.  Stomach and bowel:  Unremarkable as visualized.  No obstruction.  No  mucosal thickening.    PELVIS:  Appendix:  Normal appendix.  Bladder:  There is mild urinary bladder wall thickening in part  related to incomplete distention but may also reflect changes of  cystitis.  No stones.  Reproductive:  Bilateral tubal ligation clips.  Small left ovarian  cysts, simple in appearance and is 2.6 cm.    ABDOMEN and PELVIS:  Intraperitoneal space:  Unremarkable as visualized.  No significant  fluid collection.  No pneumoperitoneum identified.  Bones/joints:  L5 pars defects with minimal anterior spondylolisthesis  of L5 on S1 and neuroforaminal stenosis at this level.  No acute  fracture.  No dislocation.  Soft tissues:  Unremarkable as visualized.  Vasculature:  Unremarkable as visualized.  No abdominal aortic  aneurysm.  Lymph nodes:  Unremarkable as visualized.  No enlarged lymph nodes.    Impression  1.  No renal or ureteral stones. No obstructive uropathy.  2.  No solid enhancing renal masses  or renal perfusion defects  identified.  3.  There is mild urinary bladder wall thickening in part related to  incomplete distention but may also reflect changes of cystitis.  4.  L5 pars defects with minimal anterior spondylolisthesis of L5 on S1  and neuroforaminal stenosis at this level.  5. Other incidental/nonacute findings above.      This report was finalized on 5/10/2024 2:34 PM by Dr. Renzo Khan MD.     CT Stone Protocol: No results found for this or any previous visit.     KUB: No results found for this or any previous visit.       Labs:  Brief Urine Lab Results  (Last result in the past 365 days)        Color   Clarity   Blood   Leuk Est   Nitrite   Protein   CREAT   Urine HCG        12/05/24 1516 Yellow   Clear   3+   Negative   Negative   Negative                 Office Visit on 12/05/2024   Component Date Value Ref Range Status    Color 12/05/2024 Yellow  Yellow, Straw, Dark Yellow, Pam Final    Clarity, UA 12/05/2024 Clear  Clear Final    Specific Gravity  12/05/2024 1.005  1.005 - 1.030 Final    pH, Urine 12/05/2024 7.0  5.0 - 8.0 Final    Leukocytes 12/05/2024 Negative  Negative Final    Nitrite, UA 12/05/2024 Negative  Negative Final    Protein, POC 12/05/2024 Negative  Negative mg/dL Final    Glucose, UA 12/05/2024 Negative  Negative mg/dL Final    Ketones, UA 12/05/2024 Negative  Negative Final    Urobilinogen, UA 12/05/2024 Normal  Normal, 0.2 E.U./dL Final    Bilirubin 12/05/2024 Negative  Negative Final    Blood, UA 12/05/2024 3+ (A)  Negative Final    Lot Number 12/05/2024 n   Final    Expiration Date 12/05/2024 n   Final   Admission on 11/06/2024, Discharged on 11/06/2024   Component Date Value Ref Range Status    HCG, Urine, QL 11/06/2024 Negative  Negative Final    Lot Number 11/06/2024 [id number]   Final    Internal Positive Control 11/06/2024 Positive  Positive, Passed Final    Internal Negative Control 11/06/2024 Negative  Negative, Passed Final    Expiration Date 11/06/2024  03/21/2026   Final   Pre-Admission Testing on 11/01/2024   Component Date Value Ref Range Status    Glucose 11/01/2024 88  65 - 99 mg/dL Final    BUN 11/01/2024 6  6 - 20 mg/dL Final    Creatinine 11/01/2024 0.88  0.57 - 1.00 mg/dL Final    Sodium 11/01/2024 141  136 - 145 mmol/L Final    Potassium 11/01/2024 4.7  3.5 - 5.2 mmol/L Final    Chloride 11/01/2024 108 (H)  98 - 107 mmol/L Final    CO2 11/01/2024 22.9  22.0 - 29.0 mmol/L Final    Calcium 11/01/2024 10.3  8.6 - 10.5 mg/dL Final    BUN/Creatinine Ratio 11/01/2024 6.8 (L)  7.0 - 25.0 Final    Anion Gap 11/01/2024 10.1  5.0 - 15.0 mmol/L Final    eGFR 11/01/2024 87.5  >60.0 mL/min/1.73 Final    WBC 11/01/2024 8.07  3.40 - 10.80 10*3/mm3 Final    RBC 11/01/2024 4.80  3.77 - 5.28 10*6/mm3 Final    Hemoglobin 11/01/2024 12.6  12.0 - 15.9 g/dL Final    Hematocrit 11/01/2024 41.6  34.0 - 46.6 % Final    MCV 11/01/2024 86.7  79.0 - 97.0 fL Final    MCH 11/01/2024 26.3 (L)  26.6 - 33.0 pg Final    MCHC 11/01/2024 30.3 (L)  31.5 - 35.7 g/dL Final    RDW 11/01/2024 14.4  12.3 - 15.4 % Final    RDW-SD 11/01/2024 46.3  37.0 - 54.0 fl Final    MPV 11/01/2024 8.7  6.0 - 12.0 fL Final    Platelets 11/01/2024 377  140 - 450 10*3/mm3 Final   Office Visit on 10/14/2024   Component Date Value Ref Range Status    Color 10/14/2024 Yellow  Yellow, Straw, Dark Yellow, Pam Final    Clarity, UA 10/14/2024 Clear  Clear Final    Specific Gravity  10/14/2024 1.000 (A)  1.005 - 1.030 Final    pH, Urine 10/14/2024 6.0  5.0 - 8.0 Final    Leukocytes 10/14/2024 Negative  Negative Final    Nitrite, UA 10/14/2024 Negative  Negative Final    Protein, POC 10/14/2024 Negative  Negative mg/dL Final    Glucose, UA 10/14/2024 Negative  Negative mg/dL Final    Ketones, UA 10/14/2024 Negative  Negative Final    Urobilinogen, UA 10/14/2024 Normal  Normal, 0.2 E.U./dL Final    Bilirubin 10/14/2024 Negative  Negative Final    Blood, UA 10/14/2024 Negative  Negative Final    Lot Number 10/14/2024  [id number]   Final    Expiration Date 10/14/2024 102,024   Final    Urine Culture 10/14/2024 No growth   Final        Procedure: None  Procedures     I have reviewed and agree with the above PMH, PSH, FMH, social history, medications, allergies, and labs.     Assessment/Plan:   Problem List Items Addressed This Visit       Irritable bowel syndrome with both constipation and diarrhea    Relevant Medications    linaclotide (Linzess) 290 MCG capsule capsule    docusate sodium (Colace) 100 MG capsule    Interstitial cystitis - Primary       Health Maintenance:   Health Maintenance Due   Topic Date Due    HEPATITIS C SCREENING  Never done    ANNUAL PHYSICAL  Never done    PAP SMEAR  Never done    TDAP/TD VACCINES (2 - Td or Tdap) 04/11/2024    INFLUENZA VACCINE  Never done    COVID-19 Vaccine (3 - 2024-25 season) 09/01/2024        Smoking Counseling: Former smoker.  Never used smokeless tobacco.    Urine Incontinence: Patient reports that she is not currently experiencing any symptoms of urinary incontinence.    Patient was given instructions and counseling regarding her condition or for health maintenance advice. Please see specific information pulled into the AVS if appropriate.    Patient Education:   IBS -discussed with the patient the pathophysiology of this condition in detail.  Discussed forms of treatment including medications versus colonoscopy/surgical interventions when indicated.  Patient has been on Linzess and MiraLAX with minimal improvement and recommended increased dosage of Linzess to 290 mg every morning prior to breakfast.  Also send in Colace 100 mg capsules for her to start taking twice daily.  Discussed the risk of these medications in detail with the patient and she verbalized understanding.  Bladder pain/interstitial cystitis -patient is 1 month post cystoscopy hydrodistention with minimal improvement.  I do believe that her constipation is contributing to lower urinary tract symptoms.  Did  discuss the use of other medications such as anticholinergics and beta 3 agonist however she declined starting anything else at this time.  We will place the patient back in 2 months or sooner if needed.    Visit Diagnoses:    ICD-10-CM ICD-9-CM   1. Interstitial cystitis  N30.10 595.1   2. Bladder pain syndrome  N30.10 595.1   3. Irritable bowel syndrome with both constipation and diarrhea  K58.2 564.1     A total of 30 minutes were spent coordinating this patient’s care in clinic today; 20 minutes of which were face-to-face with the patient, reviewing medical history and counseling on the current treatment and followup plan.  All questions were answered to patient's satisfaction.    Meds Ordered During Visit:  New Medications Ordered This Visit   Medications    linaclotide (Linzess) 290 MCG capsule capsule     Sig: Take 1 capsule by mouth Every Morning Before Breakfast.     Dispense:  90 capsule     Refill:  3    docusate sodium (Colace) 100 MG capsule     Sig: Take 1 capsule by mouth 2 (Two) Times a Day.     Dispense:  60 capsule     Refill:  1       Follow Up Appointment: 2 months  No follow-ups on file.      This document has been electronically signed by Drew Taylor PA-C   December 5, 2024 16:21 EST    Part of this note may be an electronic transcription/translation of spoken language to printed text using the Dragon Dictation System.

## 2025-03-10 ENCOUNTER — TELEPHONE (OUTPATIENT)
Dept: GASTROENTEROLOGY | Facility: CLINIC | Age: 35
End: 2025-03-10
Payer: MEDICAID

## 2025-03-10 DIAGNOSIS — K58.2 IRRITABLE BOWEL SYNDROME WITH BOTH CONSTIPATION AND DIARRHEA: ICD-10-CM

## 2025-03-10 RX ORDER — DOCUSATE SODIUM 100 MG/1
100 CAPSULE, LIQUID FILLED ORAL 2 TIMES DAILY
Qty: 60 CAPSULE | Refills: 11 | Status: SHIPPED | OUTPATIENT
Start: 2025-03-10

## 2025-03-10 NOTE — TELEPHONE ENCOUNTER
Routing to Himanshu- I resent the docusate back in. She has 3 refills left on her Linzess     I called the pt tp let her know and there was no answer and no way to leave a vm

## 2025-03-14 ENCOUNTER — TELEPHONE (OUTPATIENT)
Dept: UROLOGY | Facility: CLINIC | Age: 35
End: 2025-03-14
Payer: MEDICAID

## 2025-03-14 DIAGNOSIS — K58.2 IRRITABLE BOWEL SYNDROME WITH BOTH CONSTIPATION AND DIARRHEA: Primary | ICD-10-CM

## 2025-03-14 NOTE — TELEPHONE ENCOUNTER
Caller: Dawna Stevens    Relationship: Self    Best call back number: [phone]    What is the medical concern/diagnosis: IBS    What specialty or service is being requested: GASTROENTEROLOGY    What is the provider, practice or medical service name: [facility]    What is the office location: [address] [place] [address]    What is the office phone number: [phone]    Any additional details: PT HAD DISCUSSED THIS WITH BRI SEVERAL TIMES.

## 2025-03-28 ENCOUNTER — OFFICE VISIT (OUTPATIENT)
Dept: UROLOGY | Facility: CLINIC | Age: 35
End: 2025-03-28
Payer: MEDICAID

## 2025-03-28 VITALS
HEART RATE: 76 BPM | HEIGHT: 66 IN | WEIGHT: 232.8 LBS | DIASTOLIC BLOOD PRESSURE: 62 MMHG | BODY MASS INDEX: 37.41 KG/M2 | SYSTOLIC BLOOD PRESSURE: 88 MMHG

## 2025-03-28 DIAGNOSIS — N30.10 INTERSTITIAL CYSTITIS: Primary | ICD-10-CM

## 2025-03-28 DIAGNOSIS — K58.2 IRRITABLE BOWEL SYNDROME WITH BOTH CONSTIPATION AND DIARRHEA: ICD-10-CM

## 2025-03-28 RX ORDER — MIRABEGRON 25 MG/1
25 TABLET, FILM COATED, EXTENDED RELEASE ORAL NIGHTLY
Qty: 90 TABLET | Refills: 3 | Status: SHIPPED | OUTPATIENT
Start: 2025-03-28

## 2025-03-28 RX ORDER — LORAZEPAM 0.5 MG/1
0.5 TABLET ORAL AS NEEDED
COMMUNITY
Start: 2025-03-12

## 2025-03-28 NOTE — PROGRESS NOTES
"Chief Complaint:    Chief Complaint   Patient presents with    Interstitial cystitis       Vital Signs:   BP (!) 88/62 (BP Location: Left arm, Patient Position: Sitting, Cuff Size: Adult)   Pulse 76   Ht 167.6 cm (65.98\")   Wt 106 kg (232 lb 12.8 oz)   BMI 37.59 kg/m²   Body mass index is 37.59 kg/m².      HPI:  Dawna Donaldson is a 34 y.o. female who presents today for follow up    History of Present Illness  Ms. donaldson returns to the clinic today for follow-up for interstitial cystitis and IBS with constipation.  She does currently take Linzess 290 mg and stool softeners daily with improvement in IBS.  She has a past medical history of IC and detrusor instability has been on amitriptyline with no significant improvement.  She reports she followed up with her psychiatrist who performed a oral swab on her and amitriptyline was not effective to her genetics for her.  She has not tried any other medications in the past.  She has underwent previous hydrodistention's by Dr. Bhatt and did have improvement however her last hydrodistention was not as beneficial.  She returns today and is interested in trying other medications.  Did discuss the use of anticholinergics versus beta 3 agonist she wishes to proceed forward with Myrbetriq.      Past Medical History:  Past Medical History:   Diagnosis Date    Abnormal Pap smear of cervix     Anxiety and depression     Bipolar 1 disorder     GERD (gastroesophageal reflux disease)     History of transfusion     [facility]   D&C 2010    Interstitial cystitis     Kidney stone     PCOS (polycystic ovarian syndrome)     PTSD (post-traumatic stress disorder)     Pyelonephritis     Urinary incontinence     Urinary tract infection        Current Meds:  Current Outpatient Medications   Medication Sig Dispense Refill    busPIRone (BUSPAR) 10 MG tablet Take 1 tablet by mouth 4 (Four) Times a Day. for anxiety      dicyclomine (BENTYL) 20 MG tablet Take 1 tablet by mouth Every 6 (Six) " Hours.      docusate sodium (Colace) 100 MG capsule Take 1 capsule by mouth 2 (Two) Times a Day. 60 capsule 11    lamoTRIgine (LaMICtal) 25 MG tablet Take 1 tablet by mouth 2 (Two) Times a Day.      linaclotide (Linzess) 290 MCG capsule capsule Take 1 capsule by mouth Every Morning Before Breakfast. 90 capsule 3    LORazepam (ATIVAN) 0.5 MG tablet Take 1 tablet by mouth As Needed.      metFORMIN (GLUCOPHAGE) 1000 MG tablet Take 1 tablet by mouth 2 (Two) Times a Day With Meals.      mirtazapine (REMERON) 15 MG tablet Take 2 tablets by mouth Every Night.  4    multivitamin with minerals (WOMENS ONE DAILY PO) Take 1 tablet by mouth Daily.      Omega-3 Fatty Acids (fish oil) 1000 MG capsule capsule Take 1,200 mg by mouth Daily With Breakfast.      polyethylene glycol (MiraLax) 17 g packet Take 17 g by mouth Daily. 30 each 3    promethazine (PHENERGAN) 25 MG tablet Take 1 tablet by mouth Every 12 (Twelve) Hours As Needed for Nausea. 20 tablet 0    propranolol (INDERAL) 20 MG tablet Take 1 tablet by mouth Daily.      tamsulosin (FLOMAX) 0.4 MG capsule 24 hr capsule Take 1 capsule by mouth Daily. 30 capsule 5    topiramate (TOPAMAX) 50 MG tablet Take 1 tablet by mouth 2 (Two) Times a Day.      HYDROcodone-acetaminophen (NORCO)  MG per tablet Take 1 tablet by mouth Every 6 (Six) Hours As Needed for Moderate Pain (Pain). (Patient not taking: Reported on 3/28/2025) 12 tablet 0    Mirabegron ER (MYRBETRIQ) 25 MG tablet sustained-release 24 hour 24 hr tablet Take 1 tablet by mouth Every Night. 90 tablet 3    phenazopyridine (Pyridium) 200 MG tablet Take 1 tablet by mouth 3 (Three) Times a Day As Needed for Bladder Spasms. (Patient not taking: Reported on 3/28/2025) 20 tablet 0     No current facility-administered medications for this visit.        Allergies:   Allergies   Allergen Reactions    Latex Rash    Penicillins Nausea And Vomiting        Past Surgical History:  Past Surgical History:   Procedure Laterality Date      SECTION      x3    COLONOSCOPY      and EGD    CYSTOSCOPY N/A 2024    Procedure: CYSTOSCOPY WITH URETHRAL DILATATION;  Surgeon: Charles Bhatt MD;  Location: [facility] OR;  Service: Urology;  Laterality: N/A;    CYSTOSCOPY BLADDER HYDRODISTENSION N/A 2024    Procedure: CYSTOSCOPY BLADDER HYDRODISTENSION;  Surgeon: Charles Bhatt MD;  Location: [facility] OR;  Service: Urology;  Laterality: N/A;    CYSTOSCOPY BLADDER HYDRODISTENSION N/A 2024    Procedure: CYSTOSCOPY BLADDER HYDRODISTENSION;  Surgeon: Charles Bhatt MD;  Location: [facility] OR;  Service: Urology;  Laterality: N/A;    DILATATION AND CURETTAGE      ENDOSCOPY      TUBAL COAGULATION LAPAROSCOPIC Bilateral 10/27/2021    Procedure: BILATERAL TUBAL FALLOPE FILSHIE CLIPPING LAPAROSCOPIC;  Surgeon: Don Arce DO;  Location: [facility] OR;  Service: Obstetrics/Gynecology;  Laterality: Bilateral;    WISDOM TOOTH EXTRACTION         Social History:  Social History     Socioeconomic History    Marital status:    Tobacco Use    Smoking status: Former     Current packs/day: 0.00     Average packs/day: 1 pack/day for 5.0 years (5.0 ttl pk-yrs)     Types: Cigarettes     Start date: 10/1/2010     Quit date: 10/1/2015     Years since quittin.4    Smokeless tobacco: Never   Vaping Use    Vaping status: Never Used   Substance and Sexual Activity    Alcohol use: No    Drug use: No    Sexual activity: Defer     Birth control/protection: Tubal ligation       Family History:  Family History   Adopted: Yes   Family history unknown: Yes       Review of Systems:  Review of Systems   Constitutional:  Negative for chills, fatigue, fever and unexpected weight change.   Respiratory:  Negative for cough, chest tightness, shortness of breath and wheezing.    Cardiovascular:  Negative for chest pain and leg swelling.   Gastrointestinal:  Positive for abdominal pain, constipation and diarrhea. Negative for nausea and vomiting.    Genitourinary:  Positive for flank pain, frequency, pelvic pain and urgency. Negative for difficulty urinating, dysuria and hematuria.   Musculoskeletal:  Positive for back pain. Negative for joint swelling.   Skin:  Negative for rash.   Neurological:  Negative for dizziness and headaches.   Psychiatric/Behavioral:  Negative for confusion and suicidal ideas.        Physical Exam:  Physical Exam  Constitutional:       General: She is not in acute distress.     Appearance: Normal appearance.   HENT:      Head: Normocephalic and atraumatic.      Nose: Nose normal.      Mouth/Throat:      Mouth: Mucous membranes are moist.   Eyes:      Conjunctiva/sclera: Conjunctivae normal.   Cardiovascular:      Rate and Rhythm: Normal rate.      Pulses: Normal pulses.   Pulmonary:      Effort: Pulmonary effort is normal.   Abdominal:      Palpations: Abdomen is soft.   Musculoskeletal:         General: Normal range of motion.      Cervical back: Normal range of motion.   Skin:     General: Skin is warm.   Neurological:      General: No focal deficit present.      Mental Status: She is alert and oriented to person, place, and time.   Psychiatric:         Mood and Affect: Mood normal.         Behavior: Behavior normal.         Thought Content: Thought content normal.         Judgment: Judgment normal.           Recent Image (CT and/or KUB):   CT Abdomen and Pelvis: Results for orders placed in visit on 05/10/24    CT Abdomen Pelvis With & Without Contrast    Narrative  EXAM:  CT Abdomen and Pelvis Without and With Intravenous Contrast    EXAM DATE:  5/10/2024 2:09 PM    CLINICAL HISTORY:  GROSS HEMATURIA/ABDOMINAL FLANK PAIN; R31.0-Gross hematuria;  N30.21-Other chronic cystitis with hematuria; R10.30-Lower abdominal  pain, unspecified    TECHNIQUE:  Axial computed tomography images of the abdomen and pelvis without and  with intravenous contrast.  Sagittal and coronal reformatted images were  created and reviewed.  This CT exam was  performed using one or more of  the following dose reduction techniques:  automated exposure control,  adjustment of the mA and/or kV according to patient size, and/or use of  iterative reconstruction technique.    COMPARISON:  3/29/2017    FINDINGS:  Lung bases:  Lung bases are clear.  No consolidation.    ABDOMEN:  Liver:  Very mild fatty infiltration of liver.  Gallbladder and bile ducts:  Unremarkable as visualized.  No calcified  stones.  No ductal dilation.  Pancreas:  Unremarkable as visualized.  No mass.  No ductal dilation.  Spleen:  Unremarkable as visualized.  No splenomegaly.  Adrenals:  Unremarkable as visualized.  No mass.  Kidneys and ureters:  Unremarkable as visualized.  No renal or  ureteral stones. No obstructive uropathy.  No solid enhancing renal  masses or renal perfusion defects identified.  Stomach and bowel:  Unremarkable as visualized.  No obstruction.  No  mucosal thickening.    PELVIS:  Appendix:  Normal appendix.  Bladder:  There is mild urinary bladder wall thickening in part  related to incomplete distention but may also reflect changes of  cystitis.  No stones.  Reproductive:  Bilateral tubal ligation clips.  Small left ovarian  cysts, simple in appearance and is 2.6 cm.    ABDOMEN and PELVIS:  Intraperitoneal space:  Unremarkable as visualized.  No significant  fluid collection.  No pneumoperitoneum identified.  Bones/joints:  L5 pars defects with minimal anterior spondylolisthesis  of L5 on S1 and neuroforaminal stenosis at this level.  No acute  fracture.  No dislocation.  Soft tissues:  Unremarkable as visualized.  Vasculature:  Unremarkable as visualized.  No abdominal aortic  aneurysm.  Lymph nodes:  Unremarkable as visualized.  No enlarged lymph nodes.    Impression  1.  No renal or ureteral stones. No obstructive uropathy.  2.  No solid enhancing renal masses or renal perfusion defects  identified.  3.  There is mild urinary bladder wall thickening in part related  to  incomplete distention but may also reflect changes of cystitis.  4.  L5 pars defects with minimal anterior spondylolisthesis of L5 on S1  and neuroforaminal stenosis at this level.  5. Other incidental/nonacute findings above.      This report was finalized on 5/10/2024 2:34 PM by Dr. Renzo Khan MD.     CT Stone Protocol: No results found for this or any previous visit.     KUB: No results found for this or any previous visit.       Labs:  Brief Urine Lab Results  (Last result in the past 365 days)        Color   Clarity   Blood   Leuk Est   Nitrite   Protein   CREAT   Urine HCG        12/05/24 1516 Yellow   Clear   3+   Negative   Negative   Negative                 No visits with results within 3 Month(s) from this visit.   Latest known visit with results is:   Office Visit on 12/05/2024   Component Date Value Ref Range Status    Color 12/05/2024 Yellow  Yellow, Straw, Dark Yellow, Pam Final    Clarity, UA 12/05/2024 Clear  Clear Final    Specific Gravity  12/05/2024 1.005  1.005 - 1.030 Final    pH, Urine 12/05/2024 7.0  5.0 - 8.0 Final    Leukocytes 12/05/2024 Negative  Negative Final    Nitrite, UA 12/05/2024 Negative  Negative Final    Protein, POC 12/05/2024 Negative  Negative mg/dL Final    Glucose, UA 12/05/2024 Negative  Negative mg/dL Final    Ketones, UA 12/05/2024 Negative  Negative Final    Urobilinogen, UA 12/05/2024 Normal  Normal, 0.2 E.U./dL Final    Bilirubin 12/05/2024 Negative  Negative Final    Blood, UA 12/05/2024 3+ (A)  Negative Final    Lot Number 12/05/2024 n   Final    Expiration Date 12/05/2024 n   Final      Procedure: None  Procedures     I have reviewed and agree with the above PMH, PSH, FMH, social history, medications, allergies, and labs.     Assessment/Plan:   Problem List Items Addressed This Visit       Irritable bowel syndrome with both constipation and diarrhea    Interstitial cystitis - Primary    Relevant Medications    Mirabegron ER (MYRBETRIQ) 25 MG tablet  sustained-release 24 hour 24 hr tablet       Health Maintenance:   Health Maintenance Due   Topic Date Due    PAP SMEAR  Never done    HEPATITIS C SCREENING  Never done    ANNUAL PHYSICAL  Never done    TDAP/TD VACCINES (2 - Td or Tdap) 04/11/2024    INFLUENZA VACCINE  Never done    COVID-19 Vaccine (3 - 2024-25 season) 09/01/2024        Smoking Counseling: Former smoker.  Never used smokeless tobacco.  Counseling given.    Urine Incontinence: Patient reports that she is not currently experiencing any symptoms of urinary incontinence.    Patient was given instructions and counseling regarding her condition or for health maintenance advice. Please see specific information pulled into the AVS if appropriate.    Patient Education:   IC/detrusor instability -patient has underwent previous hydrodistention with some improvement but does continue to have significant frequency and urgency syndrome.  She has been on amitriptyline with no significant improvement.  She does wish to try Myrbetriq 25 mg once daily.  Did discuss the risk of beta 3 agonist/anticholinergics in detail with the patient.  Advised her to discontinue medication if she begins experiencing increasing difficulty urinating, decreased urinary output, inability urinate, or urinary retention.  Otherwise we will place her back in 3 months for reevaluation  IBS -patient has refills of both Linzess and stool softeners.  She is scheduled to follow-up with gastroenterology in July.    Visit Diagnoses:    ICD-10-CM ICD-9-CM   1. Interstitial cystitis  N30.10 595.1   2. Irritable bowel syndrome with both constipation and diarrhea  K58.2 564.1     A total of 25 minutes were spent coordinating this patient’s care in clinic today; 15 minutes of which were face-to-face with the patient, reviewing medical history and counseling on the current treatment and followup plan.  All questions were answered to patient's satisfaction.    Meds Ordered During Visit:  New Medications  Ordered This Visit   Medications    Mirabegron ER (MYRBETRIQ) 25 MG tablet sustained-release 24 hour 24 hr tablet     Sig: Take 1 tablet by mouth Every Night.     Dispense:  90 tablet     Refill:  3       Follow Up Appointment: 3 months  No follow-ups on file.      This document has been electronically signed by Drew Taylor PA-C   March 28, 2025 15:21 EDT    Part of this note may be an electronic transcription/translation of spoken language to printed text using the Dragon Dictation System.

## 2025-04-02 ENCOUNTER — TELEPHONE (OUTPATIENT)
Dept: UROLOGY | Facility: CLINIC | Age: 35
End: 2025-04-02
Payer: MEDICAID

## 2025-04-07 ENCOUNTER — TELEPHONE (OUTPATIENT)
Dept: UROLOGY | Facility: CLINIC | Age: 35
End: 2025-04-07
Payer: MEDICAID

## 2025-04-07 NOTE — TELEPHONE ENCOUNTER
PA for Myrbetriq has been sent to 's insurance company and APPROVED!!          Approved today by [place] [employer] Med[employer]act 2017  The request has been approved. The authorization is effective from 04/07/2025 to 04/07/2026, as long as the member is enrolled in their current health plan. A written notification letter will follow with additional details.  Effective Date: 4/7/2025  Authorization Expiration Date: 4/7/2026  Drug  Myrbetriq 25MG er tablets  ePA cloud logo  Form  Med[employer] [place] Medicaid ePA Form 2017 NCPDP

## 2025-04-21 ENCOUNTER — HOSPITAL ENCOUNTER (EMERGENCY)
Facility: HOSPITAL | Age: 35
Discharge: HOME | End: 2025-04-21
Admitting: STUDENT IN AN ORGANIZED HEALTH CARE EDUCATION/TRAINING PROGRAM
Payer: MEDICAID

## 2025-04-21 ENCOUNTER — APPOINTMENT (OUTPATIENT)
Dept: GENERAL RADIOLOGY | Facility: HOSPITAL | Age: 35
End: 2025-04-21
Payer: MEDICAID

## 2025-04-21 ENCOUNTER — APPOINTMENT (OUTPATIENT)
Dept: MRI IMAGING | Facility: HOSPITAL | Age: 35
End: 2025-04-21
Payer: MEDICAID

## 2025-04-21 VITALS
HEART RATE: 96 BPM | OXYGEN SATURATION: 100 % | HEIGHT: 66 IN | TEMPERATURE: 98.7 F | WEIGHT: 225 LBS | RESPIRATION RATE: 18 BRPM | SYSTOLIC BLOOD PRESSURE: 111 MMHG | BODY MASS INDEX: 36.16 KG/M2 | DIASTOLIC BLOOD PRESSURE: 63 MMHG

## 2025-04-21 DIAGNOSIS — G89.29 CHRONIC BILATERAL LOW BACK PAIN WITH RIGHT-SIDED SCIATICA: Primary | ICD-10-CM

## 2025-04-21 DIAGNOSIS — M54.41 CHRONIC BILATERAL LOW BACK PAIN WITH RIGHT-SIDED SCIATICA: Primary | ICD-10-CM

## 2025-04-21 PROCEDURE — 72100 X-RAY EXAM L-S SPINE 2/3 VWS: CPT | Performed by: RADIOLOGY

## 2025-04-21 PROCEDURE — 99284 EMERGENCY DEPT VISIT MOD MDM: CPT

## 2025-04-21 PROCEDURE — 72100 X-RAY EXAM L-S SPINE 2/3 VWS: CPT

## 2025-04-21 PROCEDURE — 96372 THER/PROPH/DIAG INJ SC/IM: CPT

## 2025-04-21 PROCEDURE — 72072 X-RAY EXAM THORAC SPINE 3VWS: CPT | Performed by: RADIOLOGY

## 2025-04-21 PROCEDURE — 72072 X-RAY EXAM THORAC SPINE 3VWS: CPT

## 2025-04-21 PROCEDURE — 72148 MRI LUMBAR SPINE W/O DYE: CPT | Performed by: RADIOLOGY

## 2025-04-21 PROCEDURE — 25010000002 MORPHINE PER 10 MG

## 2025-04-21 PROCEDURE — 72148 MRI LUMBAR SPINE W/O DYE: CPT

## 2025-04-21 RX ORDER — LORAZEPAM 1 MG/1
1 TABLET ORAL ONCE
Status: COMPLETED | OUTPATIENT
Start: 2025-04-21 | End: 2025-04-21

## 2025-04-21 RX ADMIN — LORAZEPAM 1 MG: 1 TABLET ORAL at 18:05

## 2025-04-21 RX ADMIN — MORPHINE SULFATE 10 MG: 4 INJECTION, SOLUTION INTRAMUSCULAR; INTRAVENOUS at 17:19

## 2025-04-21 NOTE — ED PROVIDER NOTES
Subjective   History of Present Illness  This 34-year-old female comes in complaining of back pain that radiates down her right leg.  She has had episodes where the right leg does not do what she asked to do.  She complains of some numbness in her leg.    This is been worse since 2016 and has been gradually getting worse.    She is a physical therapy at this time.  Things are getting worse rather than better.  She describes urgency in urination but no incontinence no stool incontinence.        Review of Systems   Musculoskeletal:  Positive for arthralgias and myalgias.        Pain in the right leg right back.       Past Medical History:   Diagnosis Date    Abnormal Pap smear of cervix     Anxiety and depression     Bipolar 1 disorder     GERD (gastroesophageal reflux disease)     History of transfusion     [facility]   D&C     Interstitial cystitis     Kidney stone     PCOS (polycystic ovarian syndrome)     PTSD (post-traumatic stress disorder)     Pyelonephritis     Urinary incontinence     Urinary tract infection        Allergies   Allergen Reactions    Latex Rash    Penicillins Nausea And Vomiting       Past Surgical History:   Procedure Laterality Date     SECTION      x3    COLONOSCOPY      and EGD    CYSTOSCOPY N/A 2024    Procedure: CYSTOSCOPY WITH URETHRAL DILATATION;  Surgeon: Charles Bhatt MD;  Location: [facility];  Service: Urology;  Laterality: N/A;    CYSTOSCOPY BLADDER HYDRODISTENSION N/A 2024    Procedure: CYSTOSCOPY BLADDER HYDRODISTENSION;  Surgeon: Charles Bhatt MD;  Location: [facility] OR;  Service: Urology;  Laterality: N/A;    CYSTOSCOPY BLADDER HYDRODISTENSION N/A 2024    Procedure: CYSTOSCOPY BLADDER HYDRODISTENSION;  Surgeon: Charles Bhatt MD;  Location: [facility];  Service: Urology;  Laterality: N/A;    DILATATION AND CURETTAGE      ENDOSCOPY      TUBAL COAGULATION LAPAROSCOPIC Bilateral 10/27/2021    Procedure: BILATERAL TUBAL  FALLOPE FILSHIE CLIPPING LAPAROSCOPIC;  Surgeon: Don Arce DO;  Location: [facility];  Service: Obstetrics/Gynecology;  Laterality: Bilateral;    WISDOM TOOTH EXTRACTION         Family History   Adopted: Yes   Family history unknown: Yes       Social History     Socioeconomic History    Marital status:    Tobacco Use    Smoking status: Former     Current packs/day: 0.00     Average packs/day: 1 pack/day for 5.0 years (5.0 ttl pk-yrs)     Types: Cigarettes     Start date: 10/1/2010     Quit date: 10/1/2015     Years since quittin.5    Smokeless tobacco: Never   Vaping Use    Vaping status: Never Used   Substance and Sexual Activity    Alcohol use: No    Drug use: No    Sexual activity: Defer     Birth control/protection: Tubal ligation           Objective   Physical Exam  HENT:      Head: Normocephalic.      Right Ear: External ear normal.      Left Ear: External ear normal.      Mouth/Throat:      Mouth: Mucous membranes are moist.   Eyes:      Extraocular Movements: Extraocular movements intact.      Pupils: Pupils are equal, round, and reactive to light.   Cardiovascular:      Rate and Rhythm: Normal rate and regular rhythm.      Heart sounds: No murmur heard.  Pulmonary:      Effort: Pulmonary effort is normal.      Breath sounds: Normal breath sounds.   Abdominal:      General: Abdomen is flat. Bowel sounds are normal.      Palpations: Abdomen is soft.   Musculoskeletal:         General: No swelling. Normal range of motion.      Cervical back: Normal range of motion. No rigidity.      Comments: Patient has pain in the right back and right leg.  She has decreased patellar reflex.  She has weakened plantarflexion of the foot.   Skin:     General: Skin is warm and dry.      Capillary Refill: Capillary refill takes less than 2 seconds.   Neurological:      General: No focal deficit present.      Mental Status: She is alert.   Psychiatric:         Mood and Affect: Mood normal.          Procedures       MRI Lumbar Spine Without Contrast   Final Result       BILATERAL FORAMINAL STENOSIS AT L5-S1 DUE TO GRADE 1 ANTEROLISTHESIS   WITH DISC BULGE AND FACET AND LIGAMENTUM FLAVUM HYPERTROPHY.               This report was finalized on 4/21/2025 8:56 PM by Alicia Wang MD.          XR Spine Thoracic 3 View   Final Result       No acute fracture.   No acute dislocation.   Mild degenerative disc disease in the mid and lower thoracic spine   levels.   No chronic compression fracture deformity, subluxation, or scoliosis.           This report was finalized on 4/21/2025 6:33 PM by Dru Garcia MD.          XR Spine Lumbar AP & Lateral   Final Result       Chronic bilateral L5 pars defects.   Chronic grade 1 anterolisthesis of L5 on S1.   No acute or chronic compression fracture deformity.   No scoliosis.           This report was finalized on 4/21/2025 6:32 PM by Dru Garcia MD.                ED Course  ED Course as of 04/21/25 2156 Mon Apr 21, 2025 2108 Patient care was taken from Dr. Blackburn at shift change.  MRI revealed bilateral foraminal stenosis with some grade 1 anterior listhesis with disc bulging and facet arthropathy.  Recommend that patient follow-up outpatient [facility] pain management or neurosurgery to discuss further medical management.  Patient is able to ambulate, and move all extremities and has equal and symmetrical strength and sensation.  Electronically signed by Umu Mora DO, 04/21/25, 9:09 PM EDT.   [LK]      ED Course User Index  [LK] Umu Mora DO                                                       Medical Decision Making  34-year-old female with back pain radiating down her right leg.  She is frustrated she wants a scan done to see what is going on there.    Will get her an MRI.    She is claustrophobic so her first visit to the MRI did not work out well for her.  She has had some Ativan and hopefully she will be able to get that done.        Problems  Addressed:  Chronic bilateral low back pain with right-sided sciatica: complicated acute illness or injury    Amount and/or Complexity of Data Reviewed  Radiology: ordered.    Risk  Prescription drug management.        Final diagnoses:   Chronic bilateral low back pain with right-sided sciatica       ED Disposition  ED Disposition       ED Disposition   Discharge    Condition   Stable    Comment   --               Joy Baker, APRN  [address] [id number] [phone]               Medication List      No changes were made to your prescriptions during this visit.            Umu Mora DO  04/21/25 [id number]

## 2025-04-21 NOTE — ED NOTES
MEDICAL SCREENING:    Reason for Visit: Back pain; history of back problems.     Patient initially seen in triage.  The patient was advised further evaluation and diagnostic testing will be needed, some of the treatment and testing will be initiated in the lobby in order to begin the process.  The patient will be returned to the waiting area for the time being and possibly be re-assessed by a subsequent ED provider.  The patient will be brought back to the treatment area in as timely manner as possible.      Conrado Singh, APRN  04/21/25 1515     5

## 2025-04-22 NOTE — DISCHARGE INSTRUCTIONS
Recommend following up with pain management ultimately may need to see neurosurgery due to the degeneration in the spine and beginning to compress spinal nerves that can cause sciatica or difficulty in motor or sensation function.

## 2025-06-27 ENCOUNTER — OFFICE VISIT (OUTPATIENT)
Dept: UROLOGY | Facility: CLINIC | Age: 35
End: 2025-06-27
Payer: MEDICAID

## 2025-06-27 ENCOUNTER — TELEPHONE (OUTPATIENT)
Dept: UROLOGY | Facility: CLINIC | Age: 35
End: 2025-06-27

## 2025-06-27 VITALS
HEART RATE: 83 BPM | HEIGHT: 66 IN | WEIGHT: 235.2 LBS | SYSTOLIC BLOOD PRESSURE: 131 MMHG | DIASTOLIC BLOOD PRESSURE: 72 MMHG | BODY MASS INDEX: 37.8 KG/M2

## 2025-06-27 DIAGNOSIS — N30.10 INTERSTITIAL CYSTITIS: Primary | ICD-10-CM

## 2025-06-27 LAB
BILIRUB BLD-MCNC: NEGATIVE MG/DL
CLARITY, POC: CLEAR
COLOR UR: YELLOW
EXPIRATION DATE: NORMAL
GLUCOSE UR STRIP-MCNC: NEGATIVE MG/DL
KETONES UR QL: NEGATIVE
LEUKOCYTE EST, POC: NEGATIVE
Lab: NORMAL
NITRITE UR-MCNC: NEGATIVE MG/ML
PH UR: 8 [PH] (ref 5–8)
PROT UR STRIP-MCNC: NEGATIVE MG/DL
RBC # UR STRIP: NEGATIVE /UL
SP GR UR: 1 (ref 1–1.03)
UROBILINOGEN UR QL: NORMAL

## 2025-06-27 RX ORDER — CLONIDINE HYDROCHLORIDE 0.1 MG/1
0.1 TABLET ORAL 2 TIMES DAILY
COMMUNITY
Start: 2025-06-04

## 2025-06-27 RX ORDER — PRAZOSIN HYDROCHLORIDE 1 MG/1
1 CAPSULE ORAL NIGHTLY
COMMUNITY
Start: 2025-06-04

## 2025-06-27 RX ORDER — MIRABEGRON 50 MG/1
50 TABLET, FILM COATED, EXTENDED RELEASE ORAL DAILY
Qty: 90 TABLET | Refills: 3 | Status: SHIPPED | OUTPATIENT
Start: 2025-06-27

## 2025-06-27 RX ORDER — LUMATEPERONE 42 MG/1
42 CAPSULE ORAL DAILY
COMMUNITY
Start: 2025-06-18

## 2025-06-27 NOTE — PROGRESS NOTES
"Chief Complaint:    Chief Complaint   Patient presents with    Interstitial cystitis       Vital Signs:   /72 (BP Location: Left arm, Patient Position: Sitting, Cuff Size: Adult)   Pulse 83   Ht 167.6 cm (65.98\")   Wt 107 kg (235 lb 3.2 oz)   BMI 37.98 kg/m²   Body mass index is 37.98 kg/m².      HPI:  Dawna Donaldson is a 34 y.o. female who presents today for follow up    History of Present Illness  Ms. donaldson presents to the clinic today for follow-up for interstitial cystitis.  She has significant bladder spasms and pain with urinating.  She was started on Myrbetriq 25 mg once nightly with minimal improvement.  She has underwent cystoscopies with hydrodistention in the past but has not been interested in repeat surgical interventions.  We have tried amitriptyline however she discontinued this due to concerns of the side effects from the medication.  She has recently had spondylolisthesis of the lumbar spine and is following along with orthopedics for possible surgery.  She does wish to try to increase the dosage.  Her urine is completely negative in office today.      Past Medical History:  Past Medical History:   Diagnosis Date    Abnormal Pap smear of cervix     Anxiety and depression     Bipolar 1 disorder     GERD (gastroesophageal reflux disease)     History of transfusion     [facility]   D&C 2010    Interstitial cystitis     Kidney stone     PCOS (polycystic ovarian syndrome)     PTSD (post-traumatic stress disorder)     Pyelonephritis     Urinary incontinence     Urinary tract infection        Current Meds:  Current Outpatient Medications   Medication Sig Dispense Refill    busPIRone (BUSPAR) 10 MG tablet Take 1 tablet by mouth 4 (Four) Times a Day. for anxiety      Caplyta 42 MG capsule Take 1 capsule by mouth Daily.      cloNIDine (CATAPRES) 0.1 MG tablet Take 1 tablet by mouth 2 (Two) Times a Day.      docusate sodium (Colace) 100 MG capsule Take 1 capsule by mouth 2 (Two) Times a Day. 60 " capsule 11    lamoTRIgine (LaMICtal) 25 MG tablet Take 1 tablet by mouth 2 (Two) Times a Day.      LORazepam (ATIVAN) 0.5 MG tablet Take 1 tablet by mouth As Needed.      metFORMIN (GLUCOPHAGE) 1000 MG tablet Take 1 tablet by mouth 2 (Two) Times a Day With Meals.      mirtazapine (REMERON) 15 MG tablet Take 2 tablets by mouth Every Night.  4    multivitamin with minerals (WOMENS ONE DAILY PO) Take 1 tablet by mouth Daily.      polyethylene glycol (MiraLax) 17 g packet Take 17 g by mouth Daily. 30 each 3    prazosin (MINIPRESS) 1 MG capsule Take 1 capsule by mouth Every Night.      propranolol (INDERAL) 20 MG tablet Take 1 tablet by mouth Daily.      tamsulosin (FLOMAX) 0.4 MG capsule 24 hr capsule Take 1 capsule by mouth Daily. 30 capsule 5    topiramate (TOPAMAX) 50 MG tablet Take 1 tablet by mouth 2 (Two) Times a Day.      dicyclomine (BENTYL) 20 MG tablet Take 1 tablet by mouth Every 6 (Six) Hours. (Patient not taking: Reported on 6/27/2025)      HYDROcodone-acetaminophen (NORCO)  MG per tablet Take 1 tablet by mouth Every 6 (Six) Hours As Needed for Moderate Pain (Pain). (Patient not taking: Reported on 6/27/2025) 12 tablet 0    linaclotide (Linzess) 290 MCG capsule capsule Take 1 capsule by mouth Every Morning Before Breakfast. (Patient not taking: Reported on 6/27/2025) 90 capsule 3    Mirabegron ER (Myrbetriq) 50 MG tablet sustained-release 24 hour 24 hr tablet Take 50 mg by mouth Daily. 90 tablet 3    Omega-3 Fatty Acids (fish oil) 1000 MG capsule capsule Take 1,200 mg by mouth Daily With Breakfast. (Patient not taking: Reported on 6/27/2025)      phenazopyridine (Pyridium) 200 MG tablet Take 1 tablet by mouth 3 (Three) Times a Day As Needed for Bladder Spasms. (Patient not taking: Reported on 6/27/2025) 20 tablet 0    promethazine (PHENERGAN) 25 MG tablet Take 1 tablet by mouth Every 12 (Twelve) Hours As Needed for Nausea. (Patient not taking: Reported on 6/27/2025) 20 tablet 0     No current  facility-administered medications for this visit.        Allergies:   Allergies   Allergen Reactions    Latex Rash    Penicillins Nausea And Vomiting        Past Surgical History:  Past Surgical History:   Procedure Laterality Date     SECTION      x3    COLONOSCOPY      and EGD    CYSTOSCOPY N/A 2024    Procedure: CYSTOSCOPY WITH URETHRAL DILATATION;  Surgeon: Charles Bhatt MD;  Location: [facility];  Service: Urology;  Laterality: N/A;    CYSTOSCOPY BLADDER HYDRODISTENSION N/A 2024    Procedure: CYSTOSCOPY BLADDER HYDRODISTENSION;  Surgeon: Charles Bhatt MD;  Location: [facility];  Service: Urology;  Laterality: N/A;    CYSTOSCOPY BLADDER HYDRODISTENSION N/A 2024    Procedure: CYSTOSCOPY BLADDER HYDRODISTENSION;  Surgeon: Charles Bhatt MD;  Location: [facility];  Service: Urology;  Laterality: N/A;    DILATATION AND CURETTAGE      ENDOSCOPY      TUBAL COAGULATION LAPAROSCOPIC Bilateral 10/27/2021    Procedure: BILATERAL TUBAL FALLOPE FILSHIE CLIPPING LAPAROSCOPIC;  Surgeon: Don Arce DO;  Location: [facility];  Service: Obstetrics/Gynecology;  Laterality: Bilateral;    WISDOM TOOTH EXTRACTION         Social History:  Social History     Socioeconomic History    Marital status:    Tobacco Use    Smoking status: Former     Current packs/day: 0.00     Average packs/day: 1 pack/day for 5.0 years (5.0 ttl pk-yrs)     Types: Cigarettes     Start date: 10/1/2010     Quit date: 10/1/2015     Years since quittin.7    Smokeless tobacco: Never   Vaping Use    Vaping status: Never Used   Substance and Sexual Activity    Alcohol use: No    Drug use: No    Sexual activity: Defer     Birth control/protection: Tubal ligation       Family History:  Family History   Adopted: Yes   Family history unknown: Yes       Review of Systems:  Review of Systems   Constitutional:  Negative for chills, fatigue, fever and unexpected weight change.   Respiratory:  Negative  for cough, chest tightness, shortness of breath and wheezing.    Cardiovascular:  Negative for chest pain and leg swelling.   Gastrointestinal:  Positive for abdominal pain, constipation and diarrhea. Negative for nausea and vomiting.   Genitourinary:  Positive for flank pain, frequency, pelvic pain and urgency. Negative for difficulty urinating, dysuria and hematuria.   Musculoskeletal:  Positive for back pain. Negative for joint swelling.   Skin:  Negative for rash.   Neurological:  Negative for dizziness and headaches.   Psychiatric/Behavioral:  Negative for confusion and suicidal ideas.        Physical Exam:  Physical Exam  Constitutional:       General: She is not in acute distress.     Appearance: Normal appearance.   HENT:      Head: Normocephalic and atraumatic.      Nose: Nose normal.      Mouth/Throat:      Mouth: Mucous membranes are moist.   Eyes:      Conjunctiva/sclera: Conjunctivae normal.   Cardiovascular:      Rate and Rhythm: Normal rate.      Pulses: Normal pulses.   Pulmonary:      Effort: Pulmonary effort is normal.   Abdominal:      Palpations: Abdomen is soft.   Musculoskeletal:         General: Normal range of motion.      Cervical back: Normal range of motion.   Skin:     General: Skin is warm.   Neurological:      General: No focal deficit present.      Mental Status: She is alert and oriented to person, place, and time.   Psychiatric:         Mood and Affect: Mood normal.         Behavior: Behavior normal.         Thought Content: Thought content normal.         Judgment: Judgment normal.           Recent Image (CT and/or KUB):   CT Abdomen and Pelvis: Results for orders placed in visit on 05/10/24    CT Abdomen Pelvis With & Without Contrast    Narrative  EXAM:  CT Abdomen and Pelvis Without and With Intravenous Contrast    EXAM DATE:  5/10/2024 2:09 PM    CLINICAL HISTORY:  GROSS HEMATURIA/ABDOMINAL FLANK PAIN; R31.0-Gross hematuria;  N30.21-Other chronic cystitis with hematuria;  R10.30-Lower abdominal  pain, unspecified    TECHNIQUE:  Axial computed tomography images of the abdomen and pelvis without and  with intravenous contrast.  Sagittal and coronal reformatted images were  created and reviewed.  This CT exam was performed using one or more of  the following dose reduction techniques:  automated exposure control,  adjustment of the mA and/or kV according to patient size, and/or use of  iterative reconstruction technique.    COMPARISON:  3/29/2017    FINDINGS:  Lung bases:  Lung bases are clear.  No consolidation.    ABDOMEN:  Liver:  Very mild fatty infiltration of liver.  Gallbladder and bile ducts:  Unremarkable as visualized.  No calcified  stones.  No ductal dilation.  Pancreas:  Unremarkable as visualized.  No mass.  No ductal dilation.  Spleen:  Unremarkable as visualized.  No splenomegaly.  Adrenals:  Unremarkable as visualized.  No mass.  Kidneys and ureters:  Unremarkable as visualized.  No renal or  ureteral stones. No obstructive uropathy.  No solid enhancing renal  masses or renal perfusion defects identified.  Stomach and bowel:  Unremarkable as visualized.  No obstruction.  No  mucosal thickening.    PELVIS:  Appendix:  Normal appendix.  Bladder:  There is mild urinary bladder wall thickening in part  related to incomplete distention but may also reflect changes of  cystitis.  No stones.  Reproductive:  Bilateral tubal ligation clips.  Small left ovarian  cysts, simple in appearance and is 2.6 cm.    ABDOMEN and PELVIS:  Intraperitoneal space:  Unremarkable as visualized.  No significant  fluid collection.  No pneumoperitoneum identified.  Bones/joints:  L5 pars defects with minimal anterior spondylolisthesis  of L5 on S1 and neuroforaminal stenosis at this level.  No acute  fracture.  No dislocation.  Soft tissues:  Unremarkable as visualized.  Vasculature:  Unremarkable as visualized.  No abdominal aortic  aneurysm.  Lymph nodes:  Unremarkable as visualized.  No enlarged  lymph nodes.    Impression  1.  No renal or ureteral stones. No obstructive uropathy.  2.  No solid enhancing renal masses or renal perfusion defects  identified.  3.  There is mild urinary bladder wall thickening in part related to  incomplete distention but may also reflect changes of cystitis.  4.  L5 pars defects with minimal anterior spondylolisthesis of L5 on S1  and neuroforaminal stenosis at this level.  5. Other incidental/nonacute findings above.      This report was finalized on 5/10/2024 2:34 PM by Dr. Renzo Khan MD.     CT Stone Protocol: No results found for this or any previous visit.     KUB: No results found for this or any previous visit.       Labs:  Brief Urine Lab Results  (Last result in the past 365 days)        Color   Clarity   Blood   Leuk Est   Nitrite   Protein   CREAT   Urine HCG        06/27/25 1341 Yellow   Clear   Negative   Negative   Negative   Negative                 Office Visit on 06/27/2025   Component Date Value Ref Range Status    Color 06/27/2025 Yellow  Yellow, Straw, Dark Yellow, Pam Final    Clarity, UA 06/27/2025 Clear  Clear Final    Specific Gravity  06/27/2025 1.005  1.005 - 1.030 Final    pH, Urine 06/27/2025 8.0  5.0 - 8.0 Final    Leukocytes 06/27/2025 Negative  Negative Final    Nitrite, UA 06/27/2025 Negative  Negative Final    Protein, POC 06/27/2025 Negative  Negative mg/dL Final    Glucose, UA 06/27/2025 Negative  Negative mg/dL Final    Ketones, UA 06/27/2025 Negative  Negative Final    Urobilinogen, UA 06/27/2025 Normal  Normal, 0.2 E.U./dL Final    Bilirubin 06/27/2025 Negative  Negative Final    Blood, UA 06/27/2025 Negative  Negative Final    Lot Number 06/27/2025 [id number]   Final    Expiration Date 06/27/2025 10/25/2025   Final        Procedure: None  Procedures     I have reviewed and agree with the above PMH, PSH, FMH, social history, medications, allergies, and labs.     Assessment/Plan:   Problem List Items Addressed This Visit        Interstitial cystitis - Primary    Relevant Medications    Mirabegron ER (Myrbetriq) 50 MG tablet sustained-release 24 hour 24 hr tablet    Other Relevant Orders    POC Urinalysis Dipstick, Automated (Completed)       Health Maintenance:   Health Maintenance Due   Topic Date Due    PAP SMEAR  Never done    HEPATITIS C SCREENING  Never done    ANNUAL PHYSICAL  Never done    TDAP/TD VACCINES (2 - Td or Tdap) 04/11/2024    COVID-19 Vaccine (3 - 2024-25 season) 09/01/2024        Smoking Counseling: Former smoker.  Never used smokeless tobacco.  Counseling given.    Urine Incontinence: Patient reports that she is not currently experiencing any symptoms of urinary incontinence.    Patient was given instructions and counseling regarding her condition or for health maintenance advice. Please see specific information pulled into the AVS if appropriate.    Patient Education:   Interstitial cystitis -discussed with the patient the pathophysiology of this condition in detail.  She has been on amitriptyline however cannot tolerate medications.  Did discuss repeat surgical intervention however patient wants this time.  Patient does wish to increase Myrbetriq to 50 mg once nightly.  Did discuss the risk of this and advised to discontinue if she begins experience difficulty with urination, decreased urinary output, or weak urinary stream.  Otherwise we will place her back in 3 months or sooner if needed.    Visit Diagnoses:    ICD-10-CM ICD-9-CM   1. Interstitial cystitis  N30.10 595.1     A total of 20 minutes were spent coordinating this patient’s care in clinic today; 12 minutes of which were face-to-face with the patient, reviewing medical history and counseling on the current treatment and followup plan.  All questions were answered to patient's satisfaction.    Meds Ordered During Visit:  New Medications Ordered This Visit   Medications    Mirabegron ER (Myrbetriq) 50 MG tablet sustained-release 24 hour 24 hr tablet     Sig:  Take 50 mg by mouth Daily.     Dispense:  90 tablet     Refill:  3       Follow Up Appointment: 3 months  No follow-ups on file.      This document has been electronically signed by Drew Taylor PA-C   June 29, 2025 19:42 EDT    Part of this note may be an electronic transcription/translation of spoken language to printed text using the Dragon Dictation System.

## 2025-06-27 NOTE — TELEPHONE ENCOUNTER
PA for Myrbetriq has been sent to pt's insurance company and member has an active PA on file that is good through 04/07/2026.              Member has an active PA on file which is expiring on 04/07/2026 and has 996 no. of fills remaining.  Drug  Myrbetriq 50MG er tablets  ePA cloud logo  Form  [employer] [place] [employer] ePA Form 2017 NCPDP

## 2025-07-10 ENCOUNTER — OFFICE VISIT (OUTPATIENT)
Dept: GASTROENTEROLOGY | Facility: CLINIC | Age: 35
End: 2025-07-10
Payer: MEDICAID

## 2025-07-10 VITALS
HEART RATE: 73 BPM | BODY MASS INDEX: 38.25 KG/M2 | WEIGHT: 238 LBS | HEIGHT: 66 IN | DIASTOLIC BLOOD PRESSURE: 68 MMHG | SYSTOLIC BLOOD PRESSURE: 106 MMHG

## 2025-07-10 DIAGNOSIS — K58.1 IRRITABLE BOWEL SYNDROME WITH CONSTIPATION: Primary | ICD-10-CM

## 2025-07-10 PROCEDURE — 1159F MED LIST DOCD IN RCRD: CPT | Performed by: NURSE PRACTITIONER

## 2025-07-10 PROCEDURE — 1160F RVW MEDS BY RX/DR IN RCRD: CPT | Performed by: NURSE PRACTITIONER

## 2025-07-10 PROCEDURE — 99214 OFFICE O/P EST MOD 30 MIN: CPT | Performed by: NURSE PRACTITIONER

## 2025-07-10 RX ORDER — LURASIDONE HYDROCHLORIDE 40 MG/1
40 TABLET, FILM COATED ORAL DAILY
COMMUNITY

## 2025-07-10 RX ORDER — TENAPANOR HYDROCHLORIDE 53.2 MG/1
50 TABLET ORAL 2 TIMES DAILY
Qty: 60 TABLET | Refills: 11 | Status: SHIPPED | OUTPATIENT
Start: 2025-07-10

## 2025-07-10 RX ORDER — TRAZODONE HYDROCHLORIDE 50 MG/1
150 TABLET ORAL NIGHTLY
COMMUNITY
Start: 2025-07-02

## 2025-07-10 RX ORDER — HYDROCODONE BITARTRATE AND ACETAMINOPHEN 5; 325 MG/1; MG/1
1 TABLET ORAL 2 TIMES DAILY
COMMUNITY

## 2025-07-10 NOTE — PROGRESS NOTES
DATE OF CONSULTATION:  7/10/2025    REASON FOR REFERRAL:IBS-C    REFERRING PHYSICIAN:  Drew Taylor PA-C    CHIEF COMPLAINT:  Constipation    HISTORY OF PRESENT ILLNESS:   Dawna Stevens is a very pleasant 34 y.o. female who is being seen today at the request of Drew Taylor PA-C for evaluation and treatment of constipation. Ms. Stevens reports having chronic difficulty with constipation. She has previously tried over the counter stool softeners, fiber supplement and laxatives without improvement. She was started on Linzess ~6 months ago by urology. Initially, she was started on Linzess 145 mcg PO daily without improvement. Therefore, Linzess was increased to 290 mcg PO daily. She is also taking Colace 100 mg PO twice daily and Miralax as needed.  At present, she reports having a BM once every 1-2 weeks with stool type 1, 2 or 7 on BSS. She has associated abdominal bloating and incomplete evacuation of her colon. She also reports intermittent generalized abdominal pain.  She denies having melena, hematochezia or rectal bleeding.  Of note, she takes Norco twice daily for chronic back pain which is contributing.  She reports having upcoming appointment with neurosurgery in August.  Patient states she was adopted and therefore has no known family history of colon cancer.  She denies unintentional weight loss.  Of note, she had previous colonoscopy on 6/10/2015 with Dr. Armstrong which was unremarkable.  Random biopsies were taken of the colon which were benign.  She has no other complaints today.    PAST MEDICAL HISTORY:  Past Medical History:   Diagnosis Date    Abnormal Pap smear of cervix     Anxiety and depression     Bipolar 1 disorder     GERD (gastroesophageal reflux disease)     History of transfusion     [facility]   D&C 2010    Interstitial cystitis     Kidney stone     PCOS (polycystic ovarian syndrome)     PTSD (post-traumatic stress disorder)     Pyelonephritis     Urinary incontinence     Urinary tract  infection        PAST SURGICAL HISTORY:  Past Surgical History:   Procedure Laterality Date     SECTION      x3    COLONOSCOPY      and EGD    CYSTOSCOPY N/A 2024    Procedure: CYSTOSCOPY WITH URETHRAL DILATATION;  Surgeon: Charles Bhatt MD;  Location: [facility];  Service: Urology;  Laterality: N/A;    CYSTOSCOPY BLADDER HYDRODISTENSION N/A 2024    Procedure: CYSTOSCOPY BLADDER HYDRODISTENSION;  Surgeon: Charles Bhatt MD;  Location: [facility];  Service: Urology;  Laterality: N/A;    CYSTOSCOPY BLADDER HYDRODISTENSION N/A 2024    Procedure: CYSTOSCOPY BLADDER HYDRODISTENSION;  Surgeon: Charles Bhatt MD;  Location: [facility];  Service: Urology;  Laterality: N/A;    DILATATION AND CURETTAGE      ENDOSCOPY      TUBAL COAGULATION LAPAROSCOPIC Bilateral 10/27/2021    Procedure: BILATERAL TUBAL FALLOPE FILSHIE CLIPPING LAPAROSCOPIC;  Surgeon: Don Arce DO;  Location: [facility];  Service: Obstetrics/Gynecology;  Laterality: Bilateral;    WISDOM TOOTH EXTRACTION         FAMILY HISTORY:  Family History   Adopted: Yes   Family history unknown: Yes       SOCIAL HISTORY:  Social History     Socioeconomic History    Marital status:    Tobacco Use    Smoking status: Former     Current packs/day: 0.00     Average packs/day: 1 pack/day for 5.0 years (5.0 ttl pk-yrs)     Types: Cigarettes     Start date: 10/1/2010     Quit date: 10/1/2015     Years since quittin.7    Smokeless tobacco: Never   Vaping Use    Vaping status: Never Used   Substance and Sexual Activity    Alcohol use: No    Drug use: No    Sexual activity: Defer     Birth control/protection: Tubal ligation       MEDICATIONS:  The current medication list was reviewed in the EMR    Current Outpatient Medications:     busPIRone (BUSPAR) 10 MG tablet, Take 1 tablet by mouth 4 (Four) Times a Day. for anxiety, Disp: , Rfl:     Caplyta 42 MG capsule, Take 1 capsule by mouth Daily., Disp: , Rfl:      cloNIDine (CATAPRES) 0.1 MG tablet, Take 1 tablet by mouth 2 (Two) Times a Day., Disp: , Rfl:     dicyclomine (BENTYL) 20 MG tablet, Take 1 tablet by mouth Every 6 (Six) Hours. (Patient not taking: Reported on 6/27/2025), Disp: , Rfl:     docusate sodium (Colace) 100 MG capsule, Take 1 capsule by mouth 2 (Two) Times a Day., Disp: 60 capsule, Rfl: 11    HYDROcodone-acetaminophen (NORCO)  MG per tablet, Take 1 tablet by mouth Every 6 (Six) Hours As Needed for Moderate Pain (Pain). (Patient not taking: Reported on 6/27/2025), Disp: 12 tablet, Rfl: 0    lamoTRIgine (LaMICtal) 25 MG tablet, Take 1 tablet by mouth 2 (Two) Times a Day., Disp: , Rfl:     linaclotide (Linzess) 290 MCG capsule capsule, Take 1 capsule by mouth Every Morning Before Breakfast. (Patient not taking: Reported on 6/27/2025), Disp: 90 capsule, Rfl: 3    LORazepam (ATIVAN) 0.5 MG tablet, Take 1 tablet by mouth As Needed., Disp: , Rfl:     metFORMIN (GLUCOPHAGE) 1000 MG tablet, Take 1 tablet by mouth 2 (Two) Times a Day With Meals., Disp: , Rfl:     Mirabegron ER (Myrbetriq) 50 MG tablet sustained-release 24 hour 24 hr tablet, Take 50 mg by mouth Daily., Disp: 90 tablet, Rfl: 3    mirtazapine (REMERON) 15 MG tablet, Take 2 tablets by mouth Every Night., Disp: , Rfl: 4    multivitamin with minerals (WOMENS ONE DAILY PO), Take 1 tablet by mouth Daily., Disp: , Rfl:     Omega-3 Fatty Acids (fish oil) 1000 MG capsule capsule, Take 1,200 mg by mouth Daily With Breakfast. (Patient not taking: Reported on 6/27/2025), Disp: , Rfl:     phenazopyridine (Pyridium) 200 MG tablet, Take 1 tablet by mouth 3 (Three) Times a Day As Needed for Bladder Spasms. (Patient not taking: Reported on 6/27/2025), Disp: 20 tablet, Rfl: 0    polyethylene glycol (MiraLax) 17 g packet, Take 17 g by mouth Daily., Disp: 30 each, Rfl: 3    prazosin (MINIPRESS) 1 MG capsule, Take 1 capsule by mouth Every Night., Disp: , Rfl:     promethazine (PHENERGAN) 25 MG tablet, Take 1 tablet  "by mouth Every 12 (Twelve) Hours As Needed for Nausea. (Patient not taking: Reported on 6/27/2025), Disp: 20 tablet, Rfl: 0    propranolol (INDERAL) 20 MG tablet, Take 1 tablet by mouth Daily., Disp: , Rfl:     tamsulosin (FLOMAX) 0.4 MG capsule 24 hr capsule, Take 1 capsule by mouth Daily., Disp: 30 capsule, Rfl: 5    topiramate (TOPAMAX) 50 MG tablet, Take 1 tablet by mouth 2 (Two) Times a Day., Disp: , Rfl:     ALLERGIES:    Allergies   Allergen Reactions    Latex Rash    Penicillins Nausea And Vomiting       REVIEW OF SYSTEMS:    A comprehensive 14 point review of systems was performed.  Significant findings as mentioned above.  All other systems reviewed and are negative.      Physical Exam   Vital Signs: /68 (BP Location: Left arm, Patient Position: Sitting, Cuff Size: Large Adult)   Pulse 73   Ht 167.6 cm (65.98\")   Wt 108 kg (238 lb)   BMI 38.44 kg/m²    General: Well developed, well nourished, alert and oriented x 3, in no acute distress.   Head: ATNC   Eyes: PERRL, No evidence of conjunctivitis.   Nose: No nasal discharge.   Mouth: Oral mucosal membranes moist. No oral ulceration or hemorrhages.   Neck: Neck supple. No thyromegaly. No JVD.   Lungs: Clear in all fields to A&P without rales, rhonchi or wheezing.   Heart: Regular rate and rhythm. No murmurs, rubs, or gallops.   Abdomen: Soft. Bowel sounds are normoactive. Nontender with palpation.  Extremities: No cyanosis or edema.   Neurologic: Grossly non-focal exam      IMAGING:  XR Spine Lumbar Complete With Flex & Ext 05/09/2025         RECENT LABS:  Lab Results   Component Value Date    WBC 8.07 11/01/2024    HGB 12.6 11/01/2024    HCT 41.6 11/01/2024    MCV 86.7 11/01/2024    RDW 14.4 11/01/2024     11/01/2024    NEUTRORELPCT 64.8 10/25/2021    LYMPHORELPCT 29.5 10/25/2021    MONORELPCT 4.5 (L) 10/25/2021    EOSRELPCT 0.7 10/25/2021    BASORELPCT 0.3 10/25/2021    NEUTROABS 6.54 10/25/2021    LYMPHSABS 2.97 10/25/2021       Lab " Results   Component Value Date     11/01/2024    K 4.7 11/01/2024    CO2 22.9 11/01/2024     (H) 11/01/2024    BUN 6 11/01/2024    CREATININE 0.88 11/01/2024    EGFRIFNONA 95 10/25/2021    GLUCOSE 88 11/01/2024    CALCIUM 10.3 11/01/2024       ASSESSMENT & PLAN:  Dawna Stevens is a very pleasant 34 y.o. female with    1.  IBS-C:  - As above, constipation has been a chronic issue.  She is on Norco twice daily which is contributing.  She has previously tried over-the-counter stool softeners, fiber supplement, laxatives and Linzess 290 mcg p.o. daily without improvement.  Therefore, will discontinue Linzess and recommended trial of Ibsrela 50 mg p.o. twice daily with food.  Rx and samples provided.  - Will have patient return to clinic in 10 to 12 weeks for symptom check.  In the interim, she was advised to notify clinic if she should have any new or worsening of symptoms.  - Of note, she had previous colonoscopy on 6/10/2015 with Dr. Armstrong which was unremarkable.  Random biopsies were taken of the colon which were benign.  She has no known family history of colon cancer.      The patient was in agreement with the plan and all questions were answered to her satisfaction.     Thank you so much for allowing us to participate in the care of Dawna Stevens . Please do not hesitate to contact us with any questions or concerns.             Electronically Signed by: ENRIQUE Don , July 10, 2025 12:05 EDT       CC:   RAJAT Rahman Chelsey Blake, APRN

## 2025-07-16 ENCOUNTER — TELEPHONE (OUTPATIENT)
Dept: GASTROENTEROLOGY | Facility: CLINIC | Age: 35
End: 2025-07-16
Payer: MEDICAID

## 2025-07-16 NOTE — TELEPHONE ENCOUNTER
I called patient to let her know she can come get samples of Ibsrela, no answer, no VM set up unable to leave a message.

## 2025-07-18 ENCOUNTER — TELEPHONE (OUTPATIENT)
Dept: UROLOGY | Facility: CLINIC | Age: 35
End: 2025-07-18
Payer: MEDICAID

## 2025-07-18 DIAGNOSIS — N30.10 BLADDER PAIN SYNDROME: ICD-10-CM

## 2025-07-18 DIAGNOSIS — R35.0 FREQUENCY OF MICTURITION: ICD-10-CM

## 2025-07-18 DIAGNOSIS — N30.21 CHRONIC CYSTITIS WITH HEMATURIA: ICD-10-CM

## 2025-07-18 RX ORDER — TAMSULOSIN HYDROCHLORIDE 0.4 MG/1
1 CAPSULE ORAL DAILY
Qty: 30 CAPSULE | Refills: 5 | Status: SHIPPED | OUTPATIENT
Start: 2025-07-18

## (undated) DEVICE — APPL CHLORAPREP HI/LITE 26ML ORNG

## (undated) DEVICE — ENDOPATH XCEL BLADELESS TROCARS WITH STABILITY SLEEVES: Brand: ENDOPATH XCEL

## (undated) DEVICE — COR GYN LAPAROSCOPY: Brand: MEDLINE INDUSTRIES, INC.

## (undated) DEVICE — COR CYSTO: Brand: MEDLINE INDUSTRIES, INC.

## (undated) DEVICE — [HIGH FLOW INSUFFLATOR,  DO NOT USE IF PACKAGE IS DAMAGED,  KEEP DRY,  KEEP AWAY FROM SUNLIGHT,  PROTECT FROM HEAT AND RADIOACTIVE SOURCES.]: Brand: PNEUMOSURE

## (undated) DEVICE — GLV SURG PREMIERPRO MIC LTX PF SZ8 BRN

## (undated) DEVICE — GOWN,REINF,POLY,ECL,PP SLV,XXL: Brand: MEDLINE

## (undated) DEVICE — SUT MNCRYL 4/0 PS2 18 IN